# Patient Record
Sex: FEMALE | Race: BLACK OR AFRICAN AMERICAN | NOT HISPANIC OR LATINO | Employment: UNEMPLOYED | URBAN - METROPOLITAN AREA
[De-identification: names, ages, dates, MRNs, and addresses within clinical notes are randomized per-mention and may not be internally consistent; named-entity substitution may affect disease eponyms.]

---

## 2019-09-28 ENCOUNTER — HOSPITAL ENCOUNTER (EMERGENCY)
Facility: HOSPITAL | Age: 24
Discharge: LEFT BEFORE DISCHARGE | End: 2019-09-28
Attending: EMERGENCY MEDICINE

## 2019-09-28 VITALS
RESPIRATION RATE: 16 BRPM | DIASTOLIC BLOOD PRESSURE: 77 MMHG | OXYGEN SATURATION: 99 % | WEIGHT: 156.94 LBS | TEMPERATURE: 99 F | SYSTOLIC BLOOD PRESSURE: 116 MMHG | HEIGHT: 68 IN | HEART RATE: 73 BPM | BODY MASS INDEX: 23.79 KG/M2

## 2019-09-28 DIAGNOSIS — N89.8 VAGINAL ITCHING: Primary | ICD-10-CM

## 2019-09-28 LAB
B-HCG UR QL: NEGATIVE
BACTERIA #/AREA URNS HPF: ABNORMAL /HPF
BACTERIA GENITAL QL WET PREP: ABNORMAL
BILIRUB UR QL STRIP: NEGATIVE
CLARITY UR: ABNORMAL
CLUE CELLS VAG QL WET PREP: ABNORMAL
COLOR UR: YELLOW
FILAMENT FUNGI VAG WET PREP-#/AREA: ABNORMAL
GLUCOSE UR QL STRIP: NEGATIVE
HGB UR QL STRIP: NEGATIVE
KETONES UR QL STRIP: ABNORMAL
LEUKOCYTE ESTERASE UR QL STRIP: ABNORMAL
MICROSCOPIC COMMENT: ABNORMAL
NITRITE UR QL STRIP: NEGATIVE
PH UR STRIP: 6 [PH] (ref 5–8)
PROT UR QL STRIP: NEGATIVE
SP GR UR STRIP: >=1.03 (ref 1–1.03)
SPECIMEN SOURCE: ABNORMAL
SQUAMOUS #/AREA URNS HPF: 30 /HPF
T VAGINALIS GENITAL QL WET PREP: ABNORMAL
URN SPEC COLLECT METH UR: ABNORMAL
UROBILINOGEN UR STRIP-ACNC: NEGATIVE EU/DL
WBC #/AREA URNS HPF: 15 /HPF (ref 0–5)
WBC #/AREA VAG WET PREP: ABNORMAL
YEAST GENITAL QL WET PREP: ABNORMAL

## 2019-09-28 PROCEDURE — 81000 URINALYSIS NONAUTO W/SCOPE: CPT

## 2019-09-28 PROCEDURE — 87086 URINE CULTURE/COLONY COUNT: CPT

## 2019-09-28 PROCEDURE — 81025 URINE PREGNANCY TEST: CPT

## 2019-09-28 PROCEDURE — 99284 EMERGENCY DEPT VISIT MOD MDM: CPT

## 2019-09-28 PROCEDURE — 87210 SMEAR WET MOUNT SALINE/INK: CPT

## 2019-09-28 NOTE — ED PROVIDER NOTES
SCRIBE #1 NOTE: ICarla, am scribing for, and in the presence of, Mike Juares MD. I have scribed the HPI, ROS, and PEx.     SCRIBE #2 NOTE: I, Solange Rose, am scribing for, and in the presence of,  Miriam Hughes MD. I have scribed the remaining portions of the note not scribed by Scribe #1.      History     Chief Complaint   Patient presents with    Vaginal Itching     dx with bacterial vaginosis 3 week ago. yeast infection following completion of treatment. labia is currently swollen, itching and milky discharges x 3 weeks       Review of patient's allergies indicates:  No Known Allergies      History of Present Illness     HPI    9/28/2019, 5:16 AM  History obtained from the patient      History of Present Illness: Moose Fong is a 24 y.o. female patient who presents to the Emergency Department for evaluation of vaginal itching which onset gradually 3 weeks ago. Pt states she was dx with bacterial vaginosis 3 weeks ago and completed 7 days of Clindamycin. Following, pt began noticing a thick white discharge and took 3 days of Monistat. Pt is sexually active with 1 partner. She reports continued discharge after douching and took another 3 days of Monistat with no relief. Symptoms are constant and moderate in severity. No mitigating or exacerbating factors reported. Associated sxs include labia swelling. Patient denies any fever, chills, n/v, dysuria, urinary frequency/urgency, genital sore, hematuria, pelvic pain, vaginal bleeding, vaginal pain, dizziness, and all other sxs at this time. No further complaints or concerns at this time.       Arrival mode: Personal vehicle    PCP: Primary Doctor No        Past Medical History:  History reviewed. No pertinent history.     Past Surgical History:  History reviewed. No pertinent history.       Family History:  History reviewed. No pertinent history.     Social History:  Social History     Tobacco Use    Smoking status: Unknown    Substance and Sexual Activity    Alcohol use: Unknown    Drug use: Unknown    Sexual activity: yes        Review of Systems     Review of Systems   Constitutional: Negative for chills and fever.   HENT: Negative for sore throat.    Respiratory: Negative for cough and shortness of breath.    Cardiovascular: Negative for chest pain.   Gastrointestinal: Negative for abdominal pain, nausea and vomiting.   Genitourinary: Positive for vaginal discharge (white). Negative for dysuria, frequency, genital sores, hematuria, pelvic pain, urgency, vaginal bleeding and vaginal pain.        (+) vaginal itching  (+) labia swelling   Musculoskeletal: Negative for back pain.   Skin: Negative for pallor.   Neurological: Negative for dizziness, weakness and numbness.   All other systems reviewed and are negative.       Physical Exam     Initial Vitals [09/28/19 0502]   BP Pulse Resp Temp SpO2   116/77 73 16 98.7 °F (37.1 °C) 99 %      MAP       --          Physical Exam  Nursing Notes and Vital Signs Reviewed.  Constitutional: Patient is in no acute distress. Well-developed and well-nourished.  Head: Atraumatic. Normocephalic.  Eyes: PERRL. EOM intact. Conjunctivae are not pale. No scleral icterus.  ENT: Mucous membranes are moist. Oropharynx is clear and symmetric.    Neck: Supple. Full ROM. No lymphadenopathy.  Cardiovascular: Regular rate. Regular rhythm. No murmurs, rubs, or gallops. Distal pulses are 2+ and symmetric.  Pulmonary/Chest: No respiratory distress. Clear to auscultation bilaterally. No wheezing or rales.  Abdominal: Soft and non-distended.  There is no tenderness.  No rebound, guarding, or rigidity.   Pelvic: A female chaperone was present for this examination. Nl external inspection. Erythema and swelling of the labia.  Musculoskeletal: Moves all extremities. No obvious deformities. No edema. No calf tenderness.  Skin: Warm and dry.  Neurological:  Alert, awake, and appropriate.  Normal speech.  No acute focal  "neurological deficits are appreciated.  Psychiatric: Normal affect. Good eye contact. Appropriate in content.     ED Course   Procedures  ED Vital Signs:  Vitals:    09/28/19 0502   BP: 116/77   Pulse: 73   Resp: 16   Temp: 98.7 °F (37.1 °C)   TempSrc: Oral   SpO2: 99%   Weight: 71.2 kg (156 lb 15.5 oz)   Height: 5' 8" (1.727 m)       Abnormal Lab Results:  Labs Reviewed   VAGINAL SCREEN - Abnormal; Notable for the following components:       Result Value    WBC - Vaginal Screen Moderate (*)     Bacteria - Vaginal Screen Many (*)     All other components within normal limits   URINALYSIS, REFLEX TO URINE CULTURE - Abnormal; Notable for the following components:    Appearance, UA Cloudy (*)     Specific Gravity, UA >=1.030 (*)     Ketones, UA Trace (*)     Leukocytes, UA 2+ (*)     All other components within normal limits    Narrative:     Preferred Collection Type->Urine, Clean Catch   URINALYSIS MICROSCOPIC - Abnormal; Notable for the following components:    WBC, UA 15 (*)     Bacteria Moderate (*)     All other components within normal limits    Narrative:     Preferred Collection Type->Urine, Clean Catch   CULTURE, URINE   PREGNANCY TEST, URINE RAPID               The Emergency Provider reviewed the vital signs and test results, which are outlined above.     ED Discussion     6:00 AM: Dr. Juares transfers care of pt to Dr. Hughes pending lab results.    6:22 AM: Pt is A&O x3, appropriate and competent at this time. Pt voices desire to leave hospital because she has to go to work. She would like to be called with results. D/w pt in length need for further evaluation and treatment due to HPI and PEx. Pt declines any further evaluation or tx at this time. All risks, including worsening sx, permanent bodily harm and death, were discussed in length. Pt acknowledges all risk at this time and agrees to sign AMA form. Pt given RTER instructions. All questions and concerns addressed at this time. Pt leaving AMA.      "   Medical Decision Making:   Clinical Tests:   Lab Tests: Ordered and Reviewed           ED Medication(s):  Medications - No data to display    There are no discharge medications for this patient.              Scribe Attestation:   Scribe #1: I performed the above scribed service and the documentation accurately describes the services I performed. I attest to the accuracy of the note.     Attending:   Physician Attestation Statement for Scribe #1: I, Mike Juares MD, personally performed the services described in this documentation, as scribed by Carla Amaral, in my presence, and it is both accurate and complete.       Scribe Attestation:   Scribe #2: I performed the above scribed service and the documentation accurately describes the services I performed. I attest to the accuracy of the note.    Attending Attestation:           Physician Attestation for Scribe:    Physician Attestation Statement for Scribe #2: I, Miriam Hughes MD, reviewed documentation, as scribed by Solange Rose in my presence, and it is both accurate and complete. I also acknowledge and confirm the content of the note done by Scribe #1.           Clinical Impression     No diagnosis found.    Disposition:   Disposition: CONSTANCE Hughes MD  09/29/19 0639

## 2019-09-29 ENCOUNTER — NURSE TRIAGE (OUTPATIENT)
Dept: ADMINISTRATIVE | Facility: CLINIC | Age: 24
End: 2019-09-29

## 2019-09-29 NOTE — TELEPHONE ENCOUNTER
Reason for Disposition   [1] Caller requesting NON-URGENT health information AND [2] PCP's office is the best resource    Protocols used: INFORMATION ONLY CALL-A-    Pt called requesting lab results. Instructed to discuss with the Provider that ordered the labs on Monday.

## 2019-09-30 LAB
BACTERIA UR CULT: NORMAL
BACTERIA UR CULT: NORMAL

## 2019-10-01 ENCOUNTER — TELEPHONE (OUTPATIENT)
Dept: EMERGENCY MEDICINE | Facility: HOSPITAL | Age: 24
End: 2019-10-01

## 2019-10-06 ENCOUNTER — TELEPHONE (OUTPATIENT)
Dept: EMERGENCY MEDICINE | Facility: HOSPITAL | Age: 24
End: 2019-10-06

## 2019-10-06 NOTE — TELEPHONE ENCOUNTER
"Patient called and wanting antibiotic for "infection" and pain medication. Patient instructed that she was suppose to follow up with PCP. Patient verbalized that she dioesnt have PCP or insurance. Discussed patien with Abhi RAMIREZ NP and flagy 500 mg tablet po BID x 7 days called into pharmacy. Patient instruced to come to ED if symptoms worsen, fever or abd pain, verbalized understanding.   "

## 2019-12-17 PROBLEM — J32.2 CHRONIC ETHMOIDAL SINUSITIS: Status: ACTIVE | Noted: 2019-12-17

## 2019-12-17 PROBLEM — J34.89 NASAL OBSTRUCTION: Status: ACTIVE | Noted: 2019-12-17

## 2019-12-17 PROBLEM — J32.0 CHRONIC MAXILLARY SINUSITIS: Status: ACTIVE | Noted: 2019-12-17

## 2019-12-17 PROBLEM — J33.9 NASAL POLYPS: Status: ACTIVE | Noted: 2019-12-17

## 2019-12-17 PROBLEM — J32.1 CHRONIC FRONTAL SINUSITIS: Status: ACTIVE | Noted: 2019-12-17

## 2019-12-17 PROBLEM — J32.3 CHRONIC SPHENOIDAL SINUSITIS: Status: ACTIVE | Noted: 2019-12-17

## 2022-09-01 ENCOUNTER — OFFICE VISIT (OUTPATIENT)
Dept: OTOLARYNGOLOGY | Facility: CLINIC | Age: 27
End: 2022-09-01
Payer: COMMERCIAL

## 2022-09-01 VITALS
DIASTOLIC BLOOD PRESSURE: 80 MMHG | TEMPERATURE: 98 F | HEART RATE: 81 BPM | SYSTOLIC BLOOD PRESSURE: 124 MMHG | BODY MASS INDEX: 26.98 KG/M2 | WEIGHT: 177.5 LBS

## 2022-09-01 DIAGNOSIS — J34.3 NASAL TURBINATE HYPERTROPHY: ICD-10-CM

## 2022-09-01 DIAGNOSIS — J34.89 NASAL OBSTRUCTION: ICD-10-CM

## 2022-09-01 DIAGNOSIS — T48.5X5A RHINITIS MEDICAMENTOSA: ICD-10-CM

## 2022-09-01 DIAGNOSIS — J30.89 NON-SEASONAL ALLERGIC RHINITIS, UNSPECIFIED TRIGGER: Primary | ICD-10-CM

## 2022-09-01 DIAGNOSIS — J45.909 ASTHMA, UNSPECIFIED ASTHMA SEVERITY, UNSPECIFIED WHETHER COMPLICATED, UNSPECIFIED WHETHER PERSISTENT: ICD-10-CM

## 2022-09-01 DIAGNOSIS — J31.0 RHINITIS MEDICAMENTOSA: ICD-10-CM

## 2022-09-01 PROBLEM — J33.9 NASAL POLYPS: Status: RESOLVED | Noted: 2019-12-17 | Resolved: 2022-09-01

## 2022-09-01 PROCEDURE — 99204 OFFICE O/P NEW MOD 45 MIN: CPT | Mod: 25,S$GLB,, | Performed by: OTOLARYNGOLOGY

## 2022-09-01 PROCEDURE — 31231 NASAL ENDOSCOPY DX: CPT | Mod: S$GLB,,, | Performed by: OTOLARYNGOLOGY

## 2022-09-01 PROCEDURE — 99999 PR PBB SHADOW E&M-EST. PATIENT-LVL IV: ICD-10-PCS | Mod: PBBFAC,,, | Performed by: OTOLARYNGOLOGY

## 2022-09-01 PROCEDURE — 99999 PR PBB SHADOW E&M-EST. PATIENT-LVL IV: CPT | Mod: PBBFAC,,, | Performed by: OTOLARYNGOLOGY

## 2022-09-01 PROCEDURE — 99204 PR OFFICE/OUTPT VISIT, NEW, LEVL IV, 45-59 MIN: ICD-10-PCS | Mod: 25,S$GLB,, | Performed by: OTOLARYNGOLOGY

## 2022-09-01 PROCEDURE — 31231 PR NASAL ENDOSCOPY, DX: ICD-10-PCS | Mod: S$GLB,,, | Performed by: OTOLARYNGOLOGY

## 2022-09-01 RX ORDER — ALBUTEROL SULFATE 90 UG/1
AEROSOL, METERED RESPIRATORY (INHALATION)
COMMUNITY
Start: 2022-04-19

## 2022-09-01 RX ORDER — NORETHINDRONE ACETATE AND ETHINYL ESTRADIOL .02; 1 MG/1; MG/1
1 TABLET ORAL DAILY
COMMUNITY
Start: 2022-03-29

## 2022-09-01 NOTE — PATIENT INSTRUCTIONS
Keep up to date on our practice locations and hours!    Let us know how we are doing!              [email or web address]

## 2022-09-01 NOTE — PROGRESS NOTES
Referring Provider:    Krys Self  No address on file  Subjective:   Patient: Moose Fong [id number], :1995   Visit date:2022 10:20 AM    Chief Complaint:  Cough (Nasal congestions )    HPI:  Moose is a 27 y.o. female who I was asked to see in consultation for evaluation of the following issue(s):    Patient is a 24 year old female who presents to clinic complaining of chronic nasal obstruction/congestion with associated rhinorrhea(clear), PND, and itchy/watery eyes.  She underwent septoplasty/balloon/polypectomy by Dr. El in 2019; she states this surgery provided relief for approximately 1 week and then symptoms returned to baseline.    Patient has also seen Dr. Samaniego for complaint following surgery and received steroid injection to turbinates, which she states provided mild transient relief.  Her biggest complaint is nasal obstruction(L>R) and she states there is never a time where she feels like she can move adequate air via nasal passageway.  It is starting to effect quality of life and her studying.  Reports no relief of symptoms with ABX, steroid nasal sprays, and astelin.      The patient reports the following allergy/sinus symptoms (negative unless checked off):   [x] Sneezing   [x] Nasal congestion  [x] Itchy nose  [x]  Rhinorrhea  [] Watery eyes  [] Itchy eyes  [x] Cough  [x] Post-nasal drip  [] Ear pressure  [] Ear popping   [] Otorrhea  [x] Facial pressure  [x] Asthma  [] Eczema  [] Other    Medications:  [x]Anti-histamines:   [] Allegra   [] Benadryl   [] Claritin   [] Xyzal   [] Zyrtec   [] other  [] Singulair  [x] Nasal Sprays:   [] Astelin   [] Atrovent   [x] Flonase   [] Nasacort   [] Nasonex   [x] Other- oxymetazoline  [] SCIT  [] SLIT   [x] Oral steroid  [x] IM steroid  [] Antibiotics:   [] Amoxicillin   [] Augmentin   [] Azithromycin   [] Bactrim   [] Cefdinir   [] Clindamycin   [] Doxycycline   [] Levaquin   [] Other  [] Other    Severity: severe    She  has had 4 or 5 sinus infections in the past 12 months.     Allergy testing for this patient was not done.    Current smoker:  No    There have been no recent imaging study of the sinuses (none).  No results found for this or any previous visit.        Objective:     Physical Exam:  Vitals:  /80   Pulse 81   Temp 97.5 °F (36.4 °C) (Temporal)   Wt 80.5 kg (177 lb 7.5 oz)   LMP  (LMP Unknown) Comment: birth control  BMI 26.98 kg/m²   General appearance:  Well developed, well nourished    Communication:  no hoarseness, no dysphonia    Ears:  Otoscopy of external auditory canals and tympanic membranes was normal, clinical speech reception thresholds grossly intact, no mass/lesion of auricle.    Nose:  No masses/lesions of external nose, nasal mucosa, septum, and turbinates were within normal limits.    Mouth:  No mass/lesion of lips, teeth, gums, hard/soft palate, tongue, tonsils, or oropharynx.    Cardiovascular:  No pedal edema; Radial Pulses +2     Neck & Lymphatics:  No cervical lymphadenopathy, no neck mass/crepitus/ asymmetry, trachea is midline, no thyroid enlargement/tenderness/mass.    NASAL ENDOSCOPY     Procedure: Risks, benefits, and alternatives of the procedure were discussed with the patient, and the patient consented to the nasal endoscopy.  The nasal cavity was sprayed with a topical decongestant and anesthetic (if needed). The endoscope was passed into each nostril and each nasal cavity was visualized.  On each side the nasal cavity, sinuses (if open), turbinates, middle and superior meatus, sphenoethmoidal recess and septum were examined with the findings described below. At the end of the examination, the scope was removed. The patient tolerated the procedure well with no complications.       Endoscopic Sinonasal Exam Findings:  -     The right side has moderate edema  -     The left side has moderate edema  -     Nasal secretions: thin mucous bilaterally  -     Nasal septum: no significant  deviation or perforation appreciated   -     Inferior turbinate: hypertrophy or edema (Severe) bilaterally  -     Middle turbinate: hypertrophy or edema (Mild) bilaterally  -     Other findings: none    Total Nasal Endoscopic Polyp Score Reference Guide        Assessment & Plan:   Moose was seen today for cough.    Diagnoses and all orders for this visit:    Non-seasonal allergic rhinitis, unspecified trigger  -     Ambulatory referral/consult to Allergy; Future    Nasal turbinate hypertrophy    Rhinitis medicamentosa    Asthma, unspecified asthma severity, unspecified whether complicated, unspecified whether persistent  -     Ambulatory referral/consult to Allergy; Future    Nasal obstruction        -SINUSITIS/RHINITIS  Moose presents today for initial evaluation.  They have multiple sinonasal complaints and determination of the underlying etiology is a problem of moderate to high complexity.  Patients may present with sinonasal symptoms such as nasal obstruction as a primary anatomic disorder.  Patients may also present with recurrent or chronic inflammatory sinonasal symptoms.  Generally, patients can be stratified into one of several groups.      The first group represents patients who have frequent or recurrent upper respiratory infections, most frequently viral.  These patients most frequently have normally functioning immune system's but have high levels of exposure.  This is commonly seen in nurses and schoolteachers as well as other groups who spend large amounts of time around sick patients and children.      Other patients may have isolated rhinitis without evidence of sinusitis.  The majority of these patients have ALLERGIC rhinitis however other groups may have more rare forms of rhinitis such as nonallergic rhinitis with eosinophilia syndrome.  As a screening evaluation, if the patient has had a normal endoscopy, from time to time a simple x-ray of the sinuses may be performed in combination with  antigen specific ALLERGY testing.    The third group of patients present with significant evidence of chronic sinusitis.  Nasal endoscopy may reveal polyps or purulent drainage.  CT scan is an important aspect to determining the extent of disease.  Some patients with chronic sinusitis have an underlying etiology of ALLERGY leading to obstruction of the ostiomeatal units with furthering of the infection.  Others may have an acute infection that leads to stenosis of the sinonasal openings followed by a long, progressive course of infection.  Patients with unilateral disease who do not have inverting papilloma typically fall into this latter group.    CT scan of the sinuses has not been performed.      Antigen specific allergy testing  has not been performed.    Allergy treatment with topical steroids and/or antihistamines has not been used with good compliance with symptoms worsening.         My impression is that Moose presents with a combination of CRS and allergic rhinitis.  Exacerbated by oxymetazoline use.     My recommendation is oxymetazoline taper.  Allergy evaluation and treatment.  Return to clinic in 1 month.  Consider imaging/revision turbinate reduction if symptoms are not improving.         Thank you for allowing me to participate in the care of Moose.    León Jones MD      Answers submitted by the patient for this visit:  Review of Symptoms Questionnaire  (Submitted on 9/1/2022)  Fatigue (Tiredness)?: Yes  sinus pressure : Yes  Sinus infection(s)?: Yes  postnasal drip: Yes  None of these : Yes  Snoring?: Yes  wheezing: Yes  cough: Yes  None of these : Yes  None of these: Yes  None of these: Yes  None of these: Yes  None of these : Yes  Seasonal Allergies?: Yes  None of these : Yes  None of these: Yes  None of these: Yes  None of these: Yes

## 2022-09-06 ENCOUNTER — TELEPHONE (OUTPATIENT)
Dept: ALLERGY | Facility: CLINIC | Age: 27
End: 2022-09-06
Payer: COMMERCIAL

## 2022-09-06 ENCOUNTER — TELEPHONE (OUTPATIENT)
Dept: OTOLARYNGOLOGY | Facility: CLINIC | Age: 27
End: 2022-09-06
Payer: COMMERCIAL

## 2022-09-06 NOTE — TELEPHONE ENCOUNTER
Called pt to get her scheduled for allergy testing, no answer, left vm for pt to return call to get her scheduled

## 2022-09-06 NOTE — TELEPHONE ENCOUNTER
----- Message from Yarely Hernandez sent at 9/6/2022  1:33 PM CDT -----  Contact: Moose Carvajal is requesting a call to discuss referral to see allergist. She stated that she has been calling around but cant get an appt. Please call her back at [phone]      Thanks   JW

## 2022-09-06 NOTE — TELEPHONE ENCOUNTER
----- Message from Autumn oBo sent at 9/6/2022  2:11 PM CDT -----  Regarding: appt request/referral  Contact: pt  Dr Jones has placed a referral in the system and wants the pt to be seen before Oct 1st.  Pt can be seen at [phone].

## 2022-09-13 ENCOUNTER — TELEPHONE (OUTPATIENT)
Dept: ALLERGY | Facility: CLINIC | Age: 27
End: 2022-09-13
Payer: COMMERCIAL

## 2022-09-13 ENCOUNTER — OFFICE VISIT (OUTPATIENT)
Dept: ALLERGY | Facility: CLINIC | Age: 27
End: 2022-09-13
Payer: COMMERCIAL

## 2022-09-13 VITALS
HEART RATE: 87 BPM | WEIGHT: 175.94 LBS | HEIGHT: 69 IN | SYSTOLIC BLOOD PRESSURE: 117 MMHG | TEMPERATURE: 98 F | DIASTOLIC BLOOD PRESSURE: 83 MMHG | BODY MASS INDEX: 26.06 KG/M2

## 2022-09-13 DIAGNOSIS — J30.89 ALLERGIC RHINITIS DUE TO DUST MITE: ICD-10-CM

## 2022-09-13 DIAGNOSIS — J45.20 MILD INTERMITTENT ASTHMA WITHOUT COMPLICATION: ICD-10-CM

## 2022-09-13 PROCEDURE — 99999 PR PBB SHADOW E&M-EST. PATIENT-LVL IV: ICD-10-PCS | Mod: PBBFAC,,, | Performed by: PHYSICIAN ASSISTANT

## 2022-09-13 PROCEDURE — 99204 OFFICE O/P NEW MOD 45 MIN: CPT | Mod: 25,S$GLB,, | Performed by: PHYSICIAN ASSISTANT

## 2022-09-13 PROCEDURE — 95004 PERQ TESTS W/ALRGNC XTRCS: CPT | Mod: S$GLB,,, | Performed by: PHYSICIAN ASSISTANT

## 2022-09-13 PROCEDURE — 99999 PR PBB SHADOW E&M-EST. PATIENT-LVL IV: CPT | Mod: PBBFAC,,, | Performed by: PHYSICIAN ASSISTANT

## 2022-09-13 PROCEDURE — 95004 PR ALLERGY SKIN TESTS,ALLERGENS: ICD-10-PCS | Mod: S$GLB,,, | Performed by: PHYSICIAN ASSISTANT

## 2022-09-13 PROCEDURE — 99204 PR OFFICE/OUTPT VISIT, NEW, LEVL IV, 45-59 MIN: ICD-10-PCS | Mod: 25,S$GLB,, | Performed by: PHYSICIAN ASSISTANT

## 2022-09-13 RX ORDER — LEVOCETIRIZINE DIHYDROCHLORIDE 5 MG/1
5 TABLET, FILM COATED ORAL NIGHTLY
Qty: 30 TABLET | Refills: 11 | Status: SHIPPED | OUTPATIENT
Start: 2022-09-13 | End: 2023-09-13

## 2022-09-13 RX ORDER — IPRATROPIUM BROMIDE 21 UG/1
2 SPRAY, METERED NASAL 2 TIMES DAILY
Qty: 30 ML | Refills: 2 | Status: SHIPPED | OUTPATIENT
Start: 2022-09-13 | End: 2022-10-13

## 2022-09-13 NOTE — PATIENT INSTRUCTIONS
Dust mites are microscopic insect-like pests that live in house dust. They feed on dead skin or dander that are shed by people and pets.They can worsen asthma and allergies. Dust mites live in mattresses, bedding, upholstered furniture, carpets, and curtains in your home. No matter how clean a home is, dust mites cannot be completely eliminated. A number of things can be done to reduce the number of dust mites:  -Use a dehumidifier or air conditioner to maintain humidity levels at, or below, 50 percent.  -Encase mattress and pillows in dust mite- proof or allergen impermeable covers.  -Wash all bedding and blankets once a week in hot water, 130 to 140 degrees Fahrenheit, to kill dust mites. Non- washable bedding can be frozen overnight.  -Replace wool or feathered bedding products with synthetic materials, and traditional stuffed animals with washable ones.  -In bedrooms, replace wall to wall carpeting with bare floors, and remove fabric curtains and upholstered furniture, whenever possible.  -Use a damp mop or rag to remove dust. Never use a dry cloth, as it stirs up allergens.  -Use a double-layered microfilter bag or a HEPA filter in your vacuum .  -Wear a mask while vacuuming, and stay out of the vacuuming area for 20 minutes after vacuuming, to allow dust and allergens to settle.      Cockroach can be an allergen that worsens asthma and and nasal symptoms. If it is, the following are recommendations to decrease exposure:  - Keep food and garbage in closed, tight-lidded containers.  -Do not leave food, dirty dishes, or pet food and bowls out on countertops or floors.  -Mop the kitchen floor and wash countertops at least once a week.  -Fix leaky faucets and drain pipes.  -Fill crevices around the house through which cockroaches may enter.  -Limit the spread of food around the house, especially in the bedrooms.  -Use bait stations and other environmentally safe pesticides to prevent or reduce cockroach  infestation.

## 2022-09-13 NOTE — PROGRESS NOTES
Subjective:   Patient: Moose Fong [id number], :1995   Visit date:2022 1:18 PM    Chief Complaint:  Allergies    HPI:    Prior notes reviewed by myself.  Clinical documentation obtained by nursing staff reviewed.       HPI:     24 - 26 yo F referred by ENT, Dr. Jones, for an evaluation of environmental allergies:    Reported hx of allergy ssx since 2018 - pt was in college in [place], [place] c/o princess environment around a lot of trees and pollens  Ssx - difficulty breathing from L side of nose - breathes through mouth, dries her at night, exacerbates coughing/sore throat, PND (clear), nasal congestion, rhinorrhea, sneezing fits; year round  Meds - oxymetazoline (recently tapered off); has tried Zyrtec and allegra in the past  Never had allergy testing  Following with ENT for possible SMRIT     Recent dx of asthma - started earlier this year; albuterol prn 3-4x nights per week for the past month   No UC/ED visits, no tx with po steroid for asthma  Sleeps with ceiling fan on and floor fan    Hx of septoplasty/balloon/polypectomy by Dr. El in 2019    No food allergies    Environmental History:  Environmental Hx: Pets in the home: none. Does not smoke.       History reviewed. No pertinent past medical history.     History reviewed. No pertinent family history.    Social History     Socioeconomic History    Marital status: Single   Tobacco Use    Smoking status: Never    Smokeless tobacco: Never       Review of patient's allergies indicates:  No Known Allergies        Medication List with Changes/Refills   New Medications    IPRATROPIUM (ATROVENT) 21 MCG (0.03 %) NASAL SPRAY    2 sprays by Nasal route 2 (two) times daily.    LEVOCETIRIZINE (XYZAL) 5 MG TABLET    Take 1 tablet (5 mg total) by mouth every evening.   Current Medications    ALBUTEROL (PROVENTIL/VENTOLIN HFA) 90 MCG/ACTUATION INHALER    SMARTSI-2 Puff(s) By Mouth 4 Times Daily PRN    BUDESONIDE (PULMICORT) 0.5 MG/2 ML  "NEBULIZER SOLUTION    Combined one dose into [employer] irrigation system. Repeat the nasal irrigations twice a day.    NORETHINDRONE-ETHINYL ESTRADIOL (MICROGESTIN 1/20) 1-20 MG-MCG PER TABLET    Take 1 tablet by mouth once daily.    PREDNISONE (DELTASONE) 10 MG TABLET    30 mg x 3 days, 20 mg x 3 days, 10 mg x 3 days, then 5 mg x 3 days.         Objective:     Physical Exam:  Vitals:  /83   Pulse 87   Temp 98.4 °F (36.9 °C) (Temporal)   Ht 5' 9" (1.753 m)   Wt 79.8 kg (175 lb 14.8 oz)   LMP  (LMP Unknown) Comment: birth control  BMI 25.98 kg/m²   Constitutional:       General: No acute distress. Alert, well developed.   HENT:      Head: Normocephalic, no lesions.      Right Ear: Pinna and external ear appears normal. There is no impacted cerumen. TM clear, intact, wnl. EAC WNL.      Left Ear: Pinna and external ear appears normal. There is no impacted cerumen. TM clear, intact, wnl. EAC WNL.     Nose: No mass or lesion. Clear mucus. L IT with severe hypertrophy (against septum), R IT with mild hypertrophy.      Mouth/Throat: No oropharyngeal exudate or posterior oropharyngeal erythema.   Eyes:      General: No scleral icterus.Sclera white, extraocular movements intact.        Right eye: No discharge.         Left eye: No discharge.   Neck: Supple, no palpable nodes, no masses, trachea midline, no thyromegaly.   Cardiovascular:      Rate and Rhythm: Normal rate and regular rhythm.      Heart sounds: Normal heart sounds. No murmur heard.   Pulmonary:      Effort: Pulmonary effort is normal. No respiratory distress.      Breath sounds: Normal breath sounds. No stridor. No rhonchi, or rales. No crackles or wheezing.   Musculoskeletal:         General: No swelling, tenderness, deformity or signs of injury.    Skin:     General: Skin is warm.      Coloration: Skin is not jaundiced or pale.      Findings: No bruising, erythema, or rash.   Neurological:      Alert. Moves all extremities " spontaneously  Psychiatric:         Mood is normal. Behavior is normal.            Allergy Skin Prick testing:  Histamine: 9x9  Saline: 5x5  The patient had an appropriate response to the histamine and saline.  The following were the size of the saline and negative except for those items listed in bold:  Cat, Dog, Dust mite(F 10x11 and P 11x12), Cockroach 7x7, Alternaria, Aspergillus, Helminthosporium, Bald Pesotum  [place], Elm, Mountain, Ligustrum, Oak, Pecan, Red Cedar, Orchard, [place], B[place], Daniel, Red Top/Agrostis Alba, Rye/Lolium, Berto, English Plantain, Marsh Elder, Pigweed, Ragweed, Russian Thistle, Curvularia/Drechsiera Spicifera, Epicoccum, Fusarium, Hormodendrum/Cladosporium, Penicillium, Monilia/candida, Phoma, Stemphylium  Total: 38      Assessment & Plan:   Allergic rhinitis due to dust mite  -     Ambulatory referral/consult to Allergy    Mild intermittent asthma without complication  -     Ambulatory referral/consult to Allergy    Other orders  -     levocetirizine (XYZAL) 5 MG tablet; Take 1 tablet (5 mg total) by mouth every evening.  Dispense: 30 tablet; Refill: 11  -     ipratropium (ATROVENT) 21 mcg (0.03 %) nasal spray; 2 sprays by Nasal route 2 (two) times daily.  Dispense: 30 mL; Refill: 2    Skin prick allergy testing + Dust mites (Pt and F), cockroach  Home measures reviewed in detail and handout given today  Trial with Xyzal and Atrovent, taken consistently.  Plan for f/u in 6-8 wks   Consider AIT in future   Pt has f/u with ENT on 09/29 to discuss SMRIT further     Thank you for allowing me to participate in the care of Moose.        Malena Sharpe PA-C  [facility]  [facility]  [address]  [place] [address]  P: [phone]  F: [phone]      Total time spent in the care of this patient    New  3  [] 30-44 min  4  [x] 45-59 min  5  [] 60-74 min    Established  3  [] 20-29 min  4  [] 30-39 min  5  [] 40-54 min      [x]  preparing to see the patient (eg, review of tests)  [x] obtaining and/or reviewing separately obtained history  [x] performing a medically appropriate examination and/or evaluation  [x] counseling and educating the patient/family/caregiver  [x] ordering medications, tests, or procedures  [] referring and communicating with other health care professionals (when not separately reported)  [x] documenting clinical information in the electronic or other health record  [x] independently interpreting results (not separately reported) and communicating results to the  patient/ family/caregiver  [] care coordination (not separately reported)

## 2022-09-13 NOTE — TELEPHONE ENCOUNTER
----- Message from Kendra Joseph sent at 9/13/2022  7:46 AM CDT -----  Pt have an appt today at 8:30 but stated her GPS have her a hour drive. She would like to know if there is a later appt today or can she still come to that appt. Call back number is [phone]. Thx. EL

## 2022-09-29 ENCOUNTER — OFFICE VISIT (OUTPATIENT)
Dept: OTOLARYNGOLOGY | Facility: CLINIC | Age: 27
End: 2022-09-29
Payer: COMMERCIAL

## 2022-09-29 ENCOUNTER — TELEPHONE (OUTPATIENT)
Dept: PREADMISSION TESTING | Facility: HOSPITAL | Age: 27
End: 2022-09-29
Payer: COMMERCIAL

## 2022-09-29 ENCOUNTER — ANESTHESIA EVENT (OUTPATIENT)
Dept: SURGERY | Facility: HOSPITAL | Age: 27
End: 2022-09-29
Payer: COMMERCIAL

## 2022-09-29 VITALS — HEIGHT: 69 IN | TEMPERATURE: 98 F | BODY MASS INDEX: 26.32 KG/M2 | WEIGHT: 177.69 LBS

## 2022-09-29 DIAGNOSIS — J34.89 NASAL OBSTRUCTION: Primary | ICD-10-CM

## 2022-09-29 DIAGNOSIS — J30.89 NON-SEASONAL ALLERGIC RHINITIS, UNSPECIFIED TRIGGER: ICD-10-CM

## 2022-09-29 DIAGNOSIS — J34.3 NASAL TURBINATE HYPERTROPHY: ICD-10-CM

## 2022-09-29 PROBLEM — J32.0 CHRONIC MAXILLARY SINUSITIS: Status: RESOLVED | Noted: 2019-12-17 | Resolved: 2022-09-29

## 2022-09-29 PROBLEM — J32.1 CHRONIC FRONTAL SINUSITIS: Status: RESOLVED | Noted: 2019-12-17 | Resolved: 2022-09-29

## 2022-09-29 PROBLEM — J32.2 CHRONIC ETHMOIDAL SINUSITIS: Status: RESOLVED | Noted: 2019-12-17 | Resolved: 2022-09-29

## 2022-09-29 PROBLEM — J32.3 CHRONIC SPHENOIDAL SINUSITIS: Status: RESOLVED | Noted: 2019-12-17 | Resolved: 2022-09-29

## 2022-09-29 PROCEDURE — 99999 PR PBB SHADOW E&M-EST. PATIENT-LVL IV: ICD-10-PCS | Mod: PBBFAC,,, | Performed by: OTOLARYNGOLOGY

## 2022-09-29 PROCEDURE — 31231 PR NASAL ENDOSCOPY, DX: ICD-10-PCS | Mod: S$GLB,,, | Performed by: OTOLARYNGOLOGY

## 2022-09-29 PROCEDURE — 99214 PR OFFICE/OUTPT VISIT, EST, LEVL IV, 30-39 MIN: ICD-10-PCS | Mod: 25,S$GLB,, | Performed by: OTOLARYNGOLOGY

## 2022-09-29 PROCEDURE — 31231 NASAL ENDOSCOPY DX: CPT | Mod: S$GLB,,, | Performed by: OTOLARYNGOLOGY

## 2022-09-29 PROCEDURE — 99999 PR PBB SHADOW E&M-EST. PATIENT-LVL IV: CPT | Mod: PBBFAC,,, | Performed by: OTOLARYNGOLOGY

## 2022-09-29 PROCEDURE — 99214 OFFICE O/P EST MOD 30 MIN: CPT | Mod: 25,S$GLB,, | Performed by: OTOLARYNGOLOGY

## 2022-09-29 NOTE — ANESTHESIA PREPROCEDURE EVALUATION
09/29/2022  Moose Fong is a 27 y.o., female.      Pre-op Assessment    I have reviewed the Patient Summary Reports.     I have reviewed the Nursing Notes. I have reviewed the NPO Status.   I have reviewed the Medications.     Review of Systems  Anesthesia Hx:  No problems with previous Anesthesia  Denies Family Hx of Anesthesia complications.   Denies Personal Hx of Anesthesia complications.   Social:  Non-Smoker    Hematology/Oncology:  Hematology Normal        EENT/Dental:   chronic allergic rhinitis   Cardiovascular:  Cardiovascular Normal     Pulmonary:   Asthma mild    Renal/:  Renal/ Normal     Hepatic/GI:  Hepatic/GI Normal    Neurological:  Neurology Normal    Psych:  Psychiatric Normal           Physical Exam  General: Alert and Oriented    Airway:  Mallampati: I   Mouth Opening: Normal  TM Distance: Normal  Tongue: Normal  Neck ROM: Normal ROM    Dental:  Intact    Chest/Lungs:  Clear to auscultation, Normal Respiratory Rate    Heart:  Rate: Normal  Rhythm: Regular Rhythm        Anesthesia Plan  Type of Anesthesia, risks & benefits discussed:    Anesthesia Type: Gen ETT  Intra-op Monitoring Plan: Standard ASA Monitors  Post Op Pain Control Plan: multimodal analgesia and IV/PO Opioids PRN  Induction:  IV  Informed Consent: Informed consent signed with the Patient and all parties understand the risks and agree with anesthesia plan.  All questions answered.   ASA Score: 2  Day of Surgery Review of History & Physical: H&P Update referred to the surgeon/provider.    Ready For Surgery From Anesthesia Perspective.     .

## 2022-09-29 NOTE — PROGRESS NOTES
Referring Provider:    No referring provider defined for this encounter.  Subjective:   Patient: Moose Fong [id number], :1995   Visit date:2022 10:20 AM    Chief Complaint:  Follow-up    HPI:  Moose is a 27 y.o. female who I was asked to see in consultation for evaluation of the following issue(s):    Patient is a 24 year old female who presents to clinic complaining of chronic nasal obstruction/congestion with associated rhinorrhea(clear), PND, and itchy/watery eyes.  She underwent septoplasty/balloon/polypectomy by Dr. El in 2019; she states this surgery provided relief for approximately 1 week and then symptoms returned to baseline.    Patient has also seen Dr. Samaniego for complaint following surgery and received steroid injection to turbinates, which she states provided mild transient relief.  Her biggest complaint is nasal obstruction(L>R) and she states there is never a time where she feels like she can move adequate air via nasal passageway.  It is starting to effect quality of life and her studying.  Reports no relief of symptoms with ABX, steroid nasal sprays, and astelin.      The patient reports the following allergy/sinus symptoms (negative unless checked off):   [x] Sneezing   [x] Nasal congestion  [x] Itchy nose  [x]  Rhinorrhea  [] Watery eyes  [] Itchy eyes  [x] Cough  [x] Post-nasal drip  [] Ear pressure  [] Ear popping   [] Otorrhea  [x] Facial pressure  [x] Asthma  [] Eczema  [] Other    Medications:  [x]Anti-histamines:   [] Allegra   [] Benadryl   [] Claritin   [] Xyzal   [] Zyrtec   [] other  [] Singulair  [x] Nasal Sprays:   [] Astelin   [] Atrovent   [x] Flonase   [] Nasacort   [] Nasonex   [x] Other- oxymetazoline  [] SCIT  [] SLIT   [x] Oral steroid  [x] IM steroid  [] Antibiotics:   [] Amoxicillin   [] Augmentin   [] Azithromycin   [] Bactrim   [] Cefdinir   [] Clindamycin   [] Doxycycline   [] Levaquin   [] Other  [] Other    Severity: severe    She has  "had 4 or 5 sinus infections in the past 12 months.     Allergy testing for this patient was not done.    Current smoker:  No    There have been no recent imaging study of the sinuses (none).  No results found for this or any previous visit.        Objective:     Physical Exam:  Vitals:  Temp 97.9 °F (36.6 °C) (Temporal)   Ht 5' 9" (1.753 m)   Wt 80.6 kg (177 lb 11.1 oz)   LMP  (LMP Unknown) Comment: birth control  BMI 26.24 kg/m²   General appearance:  Well developed, well nourished    Communication:  no hoarseness, no dysphonia    Ears:  Otoscopy of external auditory canals and tympanic membranes was normal, clinical speech reception thresholds grossly intact, no mass/lesion of auricle.    Nose:  No masses/lesions of external nose, nasal mucosa, septum, and turbinates were within normal limits.    Mouth:  No mass/lesion of lips, teeth, gums, hard/soft palate, tongue, tonsils, or oropharynx.    Cardiovascular:  No pedal edema; Radial Pulses +2     Neck & Lymphatics:  No cervical lymphadenopathy, no neck mass/crepitus/ asymmetry, trachea is midline, no thyroid enlargement/tenderness/mass.    NASAL ENDOSCOPY     Procedure: Risks, benefits, and alternatives of the procedure were discussed with the patient, and the patient consented to the nasal endoscopy.  The nasal cavity was sprayed with a topical decongestant and anesthetic (if needed). The endoscope was passed into each nostril and each nasal cavity was visualized.  On each side the nasal cavity, sinuses (if open), turbinates, middle and superior meatus, sphenoethmoidal recess and septum were examined with the findings described below. At the end of the examination, the scope was removed. The patient tolerated the procedure well with no complications.       Endoscopic Sinonasal Exam Findings:  -     The right side has moderate edema  -     The left side has moderate edema  -     Nasal secretions: thin mucous bilaterally  -     Nasal septum: no significant " deviation or perforation appreciated   -     Inferior turbinate: hypertrophy or edema (Severe) bilaterally  -     Middle turbinate: hypertrophy or edema (Mild) bilaterally  -     Other findings: none    Total Nasal Endoscopic Polyp Score Reference Guide        Assessment & Plan:   Moose was seen today for follow-up.    Diagnoses and all orders for this visit:    Nasal obstruction  -     Case Request Operating Room: CAUTERIZATION, MUCOSA, NASAL TURBINATE    Nasal turbinate hypertrophy  Comments:  Severe bilateral  No improvement despite prolonged nasal steroids  Recommend SMRIT  Orders:  -     Case Request Operating Room: CAUTERIZATION, MUCOSA, NASAL TURBINATE    Non-seasonal allergic rhinitis, unspecified trigger  Comments:  Dust mite(F 10x11 and P 11x12), Cockroach 7x7  Xyzal, Atrovent, Fluticasone  Follows in Allergy Clinic    We discussed at length the risk of surgery including, but not limited to, recurrence of disease, bleeding, infection, septal perforation, tooth or cheek numbness, vision changes, orbital injury, CSF leak and changes in smell.  The patient had opportunity to ask questions and I answered all of them to their satisfaction.  We will proceed as planned.          Thank you for allowing me to participate in the care of Moose.    León Jones MD      Answers submitted by the patient for this visit:  Review of Symptoms Questionnaire  (Submitted on 9/1/2022)  Fatigue (Tiredness)?: Yes  sinus pressure : Yes  Sinus infection(s)?: Yes  postnasal drip: Yes  None of these : Yes  Snoring?: Yes  wheezing: Yes  cough: Yes  None of these : Yes  None of these: Yes  None of these: Yes  None of these: Yes  None of these : Yes  Seasonal Allergies?: Yes  None of these : Yes  None of these: Yes  None of these: Yes  None of these: Yes

## 2022-09-29 NOTE — H&P (VIEW-ONLY)
Referring Provider:    No referring provider defined for this encounter.  Subjective:   Patient: Moose Fong [id number], :1995   Visit date:2022 10:20 AM    Chief Complaint:  Follow-up    HPI:  Moose is a 27 y.o. female who I was asked to see in consultation for evaluation of the following issue(s):    Patient is a 24 year old female who presents to clinic complaining of chronic nasal obstruction/congestion with associated rhinorrhea(clear), PND, and itchy/watery eyes.  She underwent septoplasty/balloon/polypectomy by Dr. El in 2019; she states this surgery provided relief for approximately 1 week and then symptoms returned to baseline.    Patient has also seen Dr. Samaniego for complaint following surgery and received steroid injection to turbinates, which she states provided mild transient relief.  Her biggest complaint is nasal obstruction(L>R) and she states there is never a time where she feels like she can move adequate air via nasal passageway.  It is starting to effect quality of life and her studying.  Reports no relief of symptoms with ABX, steroid nasal sprays, and astelin.      The patient reports the following allergy/sinus symptoms (negative unless checked off):   [x] Sneezing   [x] Nasal congestion  [x] Itchy nose  [x]  Rhinorrhea  [] Watery eyes  [] Itchy eyes  [x] Cough  [x] Post-nasal drip  [] Ear pressure  [] Ear popping   [] Otorrhea  [x] Facial pressure  [x] Asthma  [] Eczema  [] Other    Medications:  [x]Anti-histamines:   [] Allegra   [] Benadryl   [] Claritin   [] Xyzal   [] Zyrtec   [] other  [] Singulair  [x] Nasal Sprays:   [] Astelin   [] Atrovent   [x] Flonase   [] Nasacort   [] Nasonex   [x] Other- oxymetazoline  [] SCIT  [] SLIT   [x] Oral steroid  [x] IM steroid  [] Antibiotics:   [] Amoxicillin   [] Augmentin   [] Azithromycin   [] Bactrim   [] Cefdinir   [] Clindamycin   [] Doxycycline   [] Levaquin   [] Other  [] Other    Severity: severe    She has  "had 4 or 5 sinus infections in the past 12 months.     Allergy testing for this patient was not done.    Current smoker:  No    There have been no recent imaging study of the sinuses (none).  No results found for this or any previous visit.        Objective:     Physical Exam:  Vitals:  Temp 97.9 °F (36.6 °C) (Temporal)   Ht 5' 9" (1.753 m)   Wt 80.6 kg (177 lb 11.1 oz)   LMP  (LMP Unknown) Comment: birth control  BMI 26.24 kg/m²   General appearance:  Well developed, well nourished    Communication:  no hoarseness, no dysphonia    Ears:  Otoscopy of external auditory canals and tympanic membranes was normal, clinical speech reception thresholds grossly intact, no mass/lesion of auricle.    Nose:  No masses/lesions of external nose, nasal mucosa, septum, and turbinates were within normal limits.    Mouth:  No mass/lesion of lips, teeth, gums, hard/soft palate, tongue, tonsils, or oropharynx.    Cardiovascular:  No pedal edema; Radial Pulses +2     Neck & Lymphatics:  No cervical lymphadenopathy, no neck mass/crepitus/ asymmetry, trachea is midline, no thyroid enlargement/tenderness/mass.    NASAL ENDOSCOPY     Procedure: Risks, benefits, and alternatives of the procedure were discussed with the patient, and the patient consented to the nasal endoscopy.  The nasal cavity was sprayed with a topical decongestant and anesthetic (if needed). The endoscope was passed into each nostril and each nasal cavity was visualized.  On each side the nasal cavity, sinuses (if open), turbinates, middle and superior meatus, sphenoethmoidal recess and septum were examined with the findings described below. At the end of the examination, the scope was removed. The patient tolerated the procedure well with no complications.       Endoscopic Sinonasal Exam Findings:  -     The right side has moderate edema  -     The left side has moderate edema  -     Nasal secretions: thin mucous bilaterally  -     Nasal septum: no significant " deviation or perforation appreciated   -     Inferior turbinate: hypertrophy or edema (Severe) bilaterally  -     Middle turbinate: hypertrophy or edema (Mild) bilaterally  -     Other findings: none    Total Nasal Endoscopic Polyp Score Reference Guide        Assessment & Plan:   Moose was seen today for follow-up.    Diagnoses and all orders for this visit:    Nasal obstruction  -     Case Request Operating Room: CAUTERIZATION, MUCOSA, NASAL TURBINATE    Nasal turbinate hypertrophy  Comments:  Severe bilateral  No improvement despite prolonged nasal steroids  Recommend SMRIT  Orders:  -     Case Request Operating Room: CAUTERIZATION, MUCOSA, NASAL TURBINATE    Non-seasonal allergic rhinitis, unspecified trigger  Comments:  Dust mite(F 10x11 and P 11x12), Cockroach 7x7  Xyzal, Atrovent, Fluticasone  Follows in Allergy Clinic    We discussed at length the risk of surgery including, but not limited to, recurrence of disease, bleeding, infection, septal perforation, tooth or cheek numbness, vision changes, orbital injury, CSF leak and changes in smell.  The patient had opportunity to ask questions and I answered all of them to their satisfaction.  We will proceed as planned.          Thank you for allowing me to participate in the care of Moose.    León Jones MD      Answers submitted by the patient for this visit:  Review of Symptoms Questionnaire  (Submitted on 9/1/2022)  Fatigue (Tiredness)?: Yes  sinus pressure : Yes  Sinus infection(s)?: Yes  postnasal drip: Yes  None of these : Yes  Snoring?: Yes  wheezing: Yes  cough: Yes  None of these : Yes  None of these: Yes  None of these: Yes  None of these: Yes  None of these : Yes  Seasonal Allergies?: Yes  None of these : Yes  None of these: Yes  None of these: Yes  None of these: Yes

## 2022-09-29 NOTE — TELEPHONE ENCOUNTER
Pre op instructions reviewed with patient per phone.      To confirm, your doctor has instructed you: Surgery is scheduled for 10/4/22.     Pre admit office will call the afternoon prior to surgery between 1PM and 3PM with arrival time.    Surgery will be at [facility],  The address is [address]. [place]  [id number].      IMPORTANT INSTRUCTIONS!    Do not eat or drink after 12 midnight, including water. Do not smoke or use chewing tobacco after 12 midnight  OK to brush teeth, but no gum, candy, or mints!      Take only these medicines with a small swallow of water-morning of surgery.     Albuterol       ____ Stop Aspirin, Ibuprofen, Motrin and Aleve at least 5-7 days before surgery, unless otherwise instructed by your doctor, or the nurse.   You MAY use Tylenol/acetaminophen until day of surgery.      ____  If you take diabetic medication, do NOT take morning of surgery unless instructed by Doctor. Metformin must be stopped 24 hrs prior to surgery time.       ____ Stop taking any Fish Oil supplements or Vitamins at least 5 days prior to surgery, unless instructed otherwise by your Doctor.     Bathing Instructions: The night before surgery and the morning prior to coming to the hospital:    - Shower & rinse your body as usual with anti-bacterial Soap (Dial or Lever 2000)   -Hibiclens (if indicated) use AFTER anti-bacterial soap; 1 packet PM/1 packet in AM on surgical site only   -Do not use hibiclens on your head, face, or genitals.    -Do not wash with anti-bacterial soap after you use the hibiclens.    -Do not shave surgical site 5-7 days prior to surgery.    -Pubic hair 7 days prior to surgery (gyn pt's).      Pediatric patients do not need to use anti-bacterial soap or Hibiclens.             After Bathing:   __ No powder, lotions, creams, or body spray to skin     __No deodorant for any breast procedure, PORT, or upper arm surgery     __ No makeup, mascara, nail polish or artificial nails        **SURGERY WILL BE CANCELLED IF ARTIFICIAL/NAIL POLISH IS PRESENT!!!**    __ Please remove all piercings and leave all jewelry at home.    **SURGERY WILL BE CANCELLED IF PIERCINGS ARE PRESENT!!!**      __ Dentures, Hearing Aids and Contact Lens need to be removed prior to the start of surgery.      __ Wear clean, loose-fitting clothing. Allow for dressings/bandages/surgical equipment     __ You must have transportation, and they MUST stay the entire time.         [facility] Visitor/Ride Policy:   Only 1 adult allowed (over the age of 18) to accompany you into Pre-op/Recovery Surgery Dept and must stay through the entire length of admission.     Must have a ride home from a responsible adult that you know and trust.      Pediatric Patients are allowed 2 adult visitors.     Medical Transport, Uber or Lyft can only be used if patient has a responsible adult to accompany them during ride home.           Post-Op Instructions: You will receive surgery post-op instructions by your Discharge Nurse prior to going home.     Surgical Site Infection:   Prevention of surgical site infections:   -Keep incisions clean and dry.   -Do not soak/submerge incisions in water until completely healed.   -Do not apply lotions, powders, creams, or deodorants to site.   -Always make sure hands are cleaned with antibacterial soap/ alcohol-based   prior to touching the surgical site.       Signs and symptoms:               -Redness and pain around the area where you had surgery               -Drainage of cloudy fluid from your surgical wound               -Fever over 100.4 or chills     >>>Call Surgeon office/on-call Surgeon if you experience any of these signs & symptoms post-surgery.        *Please Call [facility] Pre-Admissions Department with surgery instruction questions at [phone].     *Insurance Questions, please call [phone] or [phone]    Spoke about pre op process and surgery instructions, verbalized  understanding.

## 2022-09-30 DIAGNOSIS — Z01.818 PREOP EXAMINATION: Primary | ICD-10-CM

## 2022-10-03 ENCOUNTER — LAB VISIT (OUTPATIENT)
Dept: LAB | Facility: HOSPITAL | Age: 27
End: 2022-10-03
Attending: NURSE PRACTITIONER
Payer: COMMERCIAL

## 2022-10-03 DIAGNOSIS — Z01.818 PREOP EXAMINATION: ICD-10-CM

## 2022-10-03 LAB
ALBUMIN SERPL BCP-MCNC: 3.3 G/DL (ref 3.5–5.2)
ALP SERPL-CCNC: 43 U/L (ref 55–135)
ALT SERPL W/O P-5'-P-CCNC: 14 U/L (ref 10–44)
ANION GAP SERPL CALC-SCNC: 11 MMOL/L (ref 8–16)
AST SERPL-CCNC: 16 U/L (ref 10–40)
BASOPHILS # BLD AUTO: 0.03 K/UL (ref 0–0.2)
BASOPHILS NFR BLD: 0.4 % (ref 0–1.9)
BILIRUB SERPL-MCNC: 0.5 MG/DL (ref 0.1–1)
BUN SERPL-MCNC: 8 MG/DL (ref 6–20)
CALCIUM SERPL-MCNC: 8.8 MG/DL (ref 8.7–10.5)
CHLORIDE SERPL-SCNC: 107 MMOL/L (ref 95–110)
CO2 SERPL-SCNC: 17 MMOL/L (ref 23–29)
CREAT SERPL-MCNC: 0.8 MG/DL (ref 0.5–1.4)
DIFFERENTIAL METHOD: ABNORMAL
EOSINOPHIL # BLD AUTO: 0.5 K/UL (ref 0–0.5)
EOSINOPHIL NFR BLD: 7.3 % (ref 0–8)
ERYTHROCYTE [DISTWIDTH] IN BLOOD BY AUTOMATED COUNT: 12.6 % (ref 11.5–14.5)
EST. GFR  (NO RACE VARIABLE): >60 ML/MIN/1.73 M^2
GLUCOSE SERPL-MCNC: 73 MG/DL (ref 70–110)
HCT VFR BLD AUTO: 40.5 % (ref 37–48.5)
HGB BLD-MCNC: 12.7 G/DL (ref 12–16)
IMM GRANULOCYTES # BLD AUTO: 0.01 K/UL (ref 0–0.04)
IMM GRANULOCYTES NFR BLD AUTO: 0.1 % (ref 0–0.5)
LYMPHOCYTES # BLD AUTO: 3 K/UL (ref 1–4.8)
LYMPHOCYTES NFR BLD: 42.4 % (ref 18–48)
MCH RBC QN AUTO: 26.2 PG (ref 27–31)
MCHC RBC AUTO-ENTMCNC: 31.4 G/DL (ref 32–36)
MCV RBC AUTO: 84 FL (ref 82–98)
MONOCYTES # BLD AUTO: 0.4 K/UL (ref 0.3–1)
MONOCYTES NFR BLD: 5.3 % (ref 4–15)
NEUTROPHILS # BLD AUTO: 3.1 K/UL (ref 1.8–7.7)
NEUTROPHILS NFR BLD: 44.5 % (ref 38–73)
NRBC BLD-RTO: 0 /100 WBC
PLATELET # BLD AUTO: 272 K/UL (ref 150–450)
PMV BLD AUTO: 10.5 FL (ref 9.2–12.9)
POTASSIUM SERPL-SCNC: 3.8 MMOL/L (ref 3.5–5.1)
PROT SERPL-MCNC: 6.4 G/DL (ref 6–8.4)
RBC # BLD AUTO: 4.85 M/UL (ref 4–5.4)
SODIUM SERPL-SCNC: 135 MMOL/L (ref 136–145)
WBC # BLD AUTO: 7 K/UL (ref 3.9–12.7)

## 2022-10-03 PROCEDURE — 85025 COMPLETE CBC W/AUTO DIFF WBC: CPT | Performed by: NURSE PRACTITIONER

## 2022-10-03 PROCEDURE — 36415 COLL VENOUS BLD VENIPUNCTURE: CPT | Mod: PO | Performed by: NURSE PRACTITIONER

## 2022-10-03 PROCEDURE — 80053 COMPREHEN METABOLIC PANEL: CPT | Performed by: NURSE PRACTITIONER

## 2022-10-04 ENCOUNTER — HOSPITAL ENCOUNTER (OUTPATIENT)
Facility: HOSPITAL | Age: 27
Discharge: HOME | End: 2022-10-04
Attending: OTOLARYNGOLOGY | Admitting: OTOLARYNGOLOGY
Payer: COMMERCIAL

## 2022-10-04 ENCOUNTER — ANESTHESIA (OUTPATIENT)
Dept: SURGERY | Facility: HOSPITAL | Age: 27
End: 2022-10-04
Payer: COMMERCIAL

## 2022-10-04 VITALS
OXYGEN SATURATION: 98 % | TEMPERATURE: 98 F | SYSTOLIC BLOOD PRESSURE: 137 MMHG | HEART RATE: 89 BPM | HEIGHT: 69 IN | BODY MASS INDEX: 26.3 KG/M2 | RESPIRATION RATE: 20 BRPM | WEIGHT: 177.56 LBS | DIASTOLIC BLOOD PRESSURE: 86 MMHG

## 2022-10-04 DIAGNOSIS — J34.89 NASAL OBSTRUCTION: ICD-10-CM

## 2022-10-04 DIAGNOSIS — J34.3 NASAL TURBINATE HYPERTROPHY: Primary | ICD-10-CM

## 2022-10-04 LAB
B-HCG UR QL: NEGATIVE
CTP QC/QA: YES

## 2022-10-04 PROCEDURE — 63600175 PHARM REV CODE 636 W HCPCS: Performed by: ANESTHESIOLOGY

## 2022-10-04 PROCEDURE — C9046 COCAINE HCL NASAL SOLUTION: HCPCS | Mod: TB | Performed by: OTOLARYNGOLOGY

## 2022-10-04 PROCEDURE — 36000707: Performed by: OTOLARYNGOLOGY

## 2022-10-04 PROCEDURE — 71000015 HC POSTOP RECOV 1ST HR: Performed by: OTOLARYNGOLOGY

## 2022-10-04 PROCEDURE — 81025 URINE PREGNANCY TEST: CPT | Performed by: OTOLARYNGOLOGY

## 2022-10-04 PROCEDURE — 25000003 PHARM REV CODE 250: Mod: TB | Performed by: OTOLARYNGOLOGY

## 2022-10-04 PROCEDURE — 37000009 HC ANESTHESIA EA ADD 15 MINS: Performed by: OTOLARYNGOLOGY

## 2022-10-04 PROCEDURE — 25000003 PHARM REV CODE 250: Performed by: NURSE ANESTHETIST, CERTIFIED REGISTERED

## 2022-10-04 PROCEDURE — 63600175 PHARM REV CODE 636 W HCPCS: Performed by: NURSE ANESTHETIST, CERTIFIED REGISTERED

## 2022-10-04 PROCEDURE — 36000706: Performed by: OTOLARYNGOLOGY

## 2022-10-04 PROCEDURE — 30802 PR RF ABLATION INF TURBINATE SUBMUCOSAL: ICD-10-PCS | Mod: ,,, | Performed by: OTOLARYNGOLOGY

## 2022-10-04 PROCEDURE — 27201423 OPTIME MED/SURG SUP & DEVICES STERILE SUPPLY: Performed by: OTOLARYNGOLOGY

## 2022-10-04 PROCEDURE — D9220A PRA ANESTHESIA: ICD-10-PCS | Mod: CRNA,,, | Performed by: NURSE ANESTHETIST, CERTIFIED REGISTERED

## 2022-10-04 PROCEDURE — D9220A PRA ANESTHESIA: Mod: ANES,,, | Performed by: ANESTHESIOLOGY

## 2022-10-04 PROCEDURE — 37000008 HC ANESTHESIA 1ST 15 MINUTES: Performed by: OTOLARYNGOLOGY

## 2022-10-04 PROCEDURE — D9220A PRA ANESTHESIA: Mod: CRNA,,, | Performed by: NURSE ANESTHETIST, CERTIFIED REGISTERED

## 2022-10-04 PROCEDURE — 71000033 HC RECOVERY, INTIAL HOUR: Performed by: OTOLARYNGOLOGY

## 2022-10-04 PROCEDURE — 30802 ABLATE INF TURBINATE SUBMUC: CPT | Mod: ,,, | Performed by: OTOLARYNGOLOGY

## 2022-10-04 PROCEDURE — D9220A PRA ANESTHESIA: ICD-10-PCS | Mod: ANES,,, | Performed by: ANESTHESIOLOGY

## 2022-10-04 RX ORDER — FLUCONAZOLE 100 MG/1
100 TABLET ORAL DAILY
Qty: 10 TABLET | Refills: 0 | Status: SHIPPED | OUTPATIENT
Start: 2022-10-04 | End: 2022-10-14

## 2022-10-04 RX ORDER — MEPERIDINE HYDROCHLORIDE 25 MG/ML
12.5 INJECTION INTRAMUSCULAR; INTRAVENOUS; SUBCUTANEOUS ONCE
Status: DISCONTINUED | OUTPATIENT
Start: 2022-10-04 | End: 2022-10-04 | Stop reason: HOSPADM

## 2022-10-04 RX ORDER — ONDANSETRON 2 MG/ML
4 INJECTION INTRAMUSCULAR; INTRAVENOUS ONCE AS NEEDED
Status: DISCONTINUED | OUTPATIENT
Start: 2022-10-04 | End: 2022-10-04 | Stop reason: HOSPADM

## 2022-10-04 RX ORDER — SODIUM CHLORIDE, SODIUM LACTATE, POTASSIUM CHLORIDE, CALCIUM CHLORIDE 600; 310; 30; 20 MG/100ML; MG/100ML; MG/100ML; MG/100ML
INJECTION, SOLUTION INTRAVENOUS CONTINUOUS
Status: DISCONTINUED | OUTPATIENT
Start: 2022-10-04 | End: 2022-10-04 | Stop reason: HOSPADM

## 2022-10-04 RX ORDER — DEXMEDETOMIDINE HYDROCHLORIDE 100 UG/ML
INJECTION, SOLUTION INTRAVENOUS
Status: DISCONTINUED | OUTPATIENT
Start: 2022-10-04 | End: 2022-10-04

## 2022-10-04 RX ORDER — ACETAMINOPHEN 10 MG/ML
INJECTION, SOLUTION INTRAVENOUS
Status: DISCONTINUED | OUTPATIENT
Start: 2022-10-04 | End: 2022-10-04

## 2022-10-04 RX ORDER — COCAINE HYDROCHLORIDE 40 MG/ML
SOLUTION NASAL
Status: DISCONTINUED
Start: 2022-10-04 | End: 2022-10-04 | Stop reason: HOSPADM

## 2022-10-04 RX ORDER — OXYCODONE AND ACETAMINOPHEN 5; 325 MG/1; MG/1
1 TABLET ORAL EVERY 4 HOURS PRN
Qty: 30 TABLET | Refills: 0 | Status: SHIPPED | OUTPATIENT
Start: 2022-10-04 | End: 2022-10-11

## 2022-10-04 RX ORDER — OXYMETAZOLINE HCL 0.05 %
SPRAY, NON-AEROSOL (ML) NASAL
Status: DISCONTINUED | OUTPATIENT
Start: 2022-10-04 | End: 2022-10-04 | Stop reason: HOSPADM

## 2022-10-04 RX ORDER — ONDANSETRON HYDROCHLORIDE 8 MG/1
8 TABLET, FILM COATED ORAL EVERY 8 HOURS PRN
Qty: 15 TABLET | Refills: 1 | Status: SHIPPED | OUTPATIENT
Start: 2022-10-04

## 2022-10-04 RX ORDER — HYDROCODONE BITARTRATE AND ACETAMINOPHEN 5; 325 MG/1; MG/1
1 TABLET ORAL
Status: DISCONTINUED | OUTPATIENT
Start: 2022-10-04 | End: 2022-10-04 | Stop reason: HOSPADM

## 2022-10-04 RX ORDER — ONDANSETRON 2 MG/ML
INJECTION INTRAMUSCULAR; INTRAVENOUS
Status: DISCONTINUED | OUTPATIENT
Start: 2022-10-04 | End: 2022-10-04

## 2022-10-04 RX ORDER — PROPOFOL 10 MG/ML
VIAL (ML) INTRAVENOUS
Status: DISCONTINUED | OUTPATIENT
Start: 2022-10-04 | End: 2022-10-04

## 2022-10-04 RX ORDER — AMOXICILLIN AND CLAVULANATE POTASSIUM 875; 125 MG/1; MG/1
1 TABLET, FILM COATED ORAL EVERY 12 HOURS
Qty: 20 TABLET | Refills: 0 | Status: SHIPPED | OUTPATIENT
Start: 2022-10-04 | End: 2022-10-14

## 2022-10-04 RX ORDER — ETONOGESTREL 68 MG/1
68 IMPLANT SUBCUTANEOUS ONCE
COMMUNITY

## 2022-10-04 RX ORDER — LIDOCAINE HYDROCHLORIDE 20 MG/ML
INJECTION, SOLUTION EPIDURAL; INFILTRATION; INTRACAUDAL; PERINEURAL
Status: DISCONTINUED | OUTPATIENT
Start: 2022-10-04 | End: 2022-10-04

## 2022-10-04 RX ORDER — DEXAMETHASONE SODIUM PHOSPHATE 4 MG/ML
INJECTION, SOLUTION INTRA-ARTICULAR; INTRALESIONAL; INTRAMUSCULAR; INTRAVENOUS; SOFT TISSUE
Status: DISCONTINUED | OUTPATIENT
Start: 2022-10-04 | End: 2022-10-04

## 2022-10-04 RX ORDER — ROCURONIUM BROMIDE 10 MG/ML
INJECTION, SOLUTION INTRAVENOUS
Status: DISCONTINUED | OUTPATIENT
Start: 2022-10-04 | End: 2022-10-04

## 2022-10-04 RX ORDER — SUCCINYLCHOLINE CHLORIDE 20 MG/ML
INJECTION INTRAMUSCULAR; INTRAVENOUS
Status: DISCONTINUED | OUTPATIENT
Start: 2022-10-04 | End: 2022-10-04

## 2022-10-04 RX ORDER — MIDAZOLAM HYDROCHLORIDE 1 MG/ML
INJECTION, SOLUTION INTRAMUSCULAR; INTRAVENOUS
Status: DISCONTINUED | OUTPATIENT
Start: 2022-10-04 | End: 2022-10-04

## 2022-10-04 RX ORDER — FENTANYL CITRATE 50 UG/ML
25 INJECTION, SOLUTION INTRAMUSCULAR; INTRAVENOUS EVERY 5 MIN PRN
Status: DISCONTINUED | OUTPATIENT
Start: 2022-10-04 | End: 2022-10-04 | Stop reason: HOSPADM

## 2022-10-04 RX ORDER — COCAINE HYDROCHLORIDE 40 MG/ML
SOLUTION NASAL
Status: DISCONTINUED | OUTPATIENT
Start: 2022-10-04 | End: 2022-10-04 | Stop reason: HOSPADM

## 2022-10-04 RX ORDER — ALBUTEROL SULFATE 0.83 MG/ML
2.5 SOLUTION RESPIRATORY (INHALATION) EVERY 4 HOURS PRN
Status: DISCONTINUED | OUTPATIENT
Start: 2022-10-04 | End: 2022-10-04 | Stop reason: HOSPADM

## 2022-10-04 RX ORDER — OXYMETAZOLINE HCL 0.05 %
SPRAY, NON-AEROSOL (ML) NASAL
Status: DISCONTINUED
Start: 2022-10-04 | End: 2022-10-04 | Stop reason: HOSPADM

## 2022-10-04 RX ORDER — FENTANYL CITRATE 50 UG/ML
INJECTION, SOLUTION INTRAMUSCULAR; INTRAVENOUS
Status: DISCONTINUED | OUTPATIENT
Start: 2022-10-04 | End: 2022-10-04

## 2022-10-04 RX ORDER — DIPHENHYDRAMINE HYDROCHLORIDE 50 MG/ML
25 INJECTION INTRAMUSCULAR; INTRAVENOUS EVERY 6 HOURS PRN
Status: DISCONTINUED | OUTPATIENT
Start: 2022-10-04 | End: 2022-10-04 | Stop reason: HOSPADM

## 2022-10-04 RX ADMIN — SUCCINYLCHOLINE CHLORIDE 120 MG: 20 INJECTION, SOLUTION INTRAMUSCULAR; INTRAVENOUS at 11:10

## 2022-10-04 RX ADMIN — MIDAZOLAM 2 MG: 1 INJECTION INTRAMUSCULAR; INTRAVENOUS at 11:10

## 2022-10-04 RX ADMIN — LIDOCAINE HYDROCHLORIDE 70 MG: 20 INJECTION, SOLUTION EPIDURAL; INFILTRATION; INTRACAUDAL; PERINEURAL at 11:10

## 2022-10-04 RX ADMIN — ONDANSETRON 4 MG: 2 INJECTION, SOLUTION INTRAMUSCULAR; INTRAVENOUS at 12:10

## 2022-10-04 RX ADMIN — DEXMEDETOMIDINE HYDROCHLORIDE 4 MCG: 100 INJECTION, SOLUTION INTRAVENOUS at 01:10

## 2022-10-04 RX ADMIN — ROCURONIUM BROMIDE 5 MG: 10 INJECTION, SOLUTION INTRAVENOUS at 11:10

## 2022-10-04 RX ADMIN — SODIUM CHLORIDE, SODIUM LACTATE, POTASSIUM CHLORIDE, AND CALCIUM CHLORIDE: .6; .31; .03; .02 INJECTION, SOLUTION INTRAVENOUS at 09:10

## 2022-10-04 RX ADMIN — FENTANYL CITRATE 50 MCG: 50 INJECTION, SOLUTION INTRAMUSCULAR; INTRAVENOUS at 01:10

## 2022-10-04 RX ADMIN — DEXAMETHASONE SODIUM PHOSPHATE 12 MG: 4 INJECTION, SOLUTION INTRA-ARTICULAR; INTRALESIONAL; INTRAMUSCULAR; INTRAVENOUS; SOFT TISSUE at 12:10

## 2022-10-04 RX ADMIN — ACETAMINOPHEN 1000 MG: 10 INJECTION, SOLUTION INTRAVENOUS at 12:10

## 2022-10-04 RX ADMIN — FENTANYL CITRATE 50 MCG: 50 INJECTION, SOLUTION INTRAMUSCULAR; INTRAVENOUS at 12:10

## 2022-10-04 RX ADMIN — FENTANYL CITRATE 100 MCG: 50 INJECTION, SOLUTION INTRAMUSCULAR; INTRAVENOUS at 11:10

## 2022-10-04 RX ADMIN — DEXTROSE 2 G: 50 INJECTION, SOLUTION INTRAVENOUS at 12:10

## 2022-10-04 RX ADMIN — PROPOFOL 170 MG: 10 INJECTION, EMULSION INTRAVENOUS at 11:10

## 2022-10-04 NOTE — DISCHARGE SUMMARY
[facility]  Discharge Note  Short Stay    Procedure(s) (LRB):  CAUTERIZATION, MUCOSA, NASAL TURBINATE (Bilateral)      OUTCOME: Patient tolerated treatment/procedure well without complication and is now ready for discharge.    DISPOSITION: Home or Self Care    FINAL DIAGNOSIS:  <principal problem not specified>    FOLLOWUP: In clinic    DISCHARGE INSTRUCTIONS:  No discharge procedures on file.     TIME SPENT ON DISCHARGE:  minutes

## 2022-10-04 NOTE — DISCHARGE INSTRUCTIONS
ENDOSCOPIC SINUS SURGERY POST-OP INSTRUCTIONS    Rarely, is nasal packing (absorbent bandage) required (less than 5% of the time). This will be remove before you go home, or in some cases, on your first post-up appointment.    Slight trickling of blood in the back of your throat, or on your drip pad in front of your nose, and/or crusting of secretions for the first few days, is to be expected. You should change your drip pad periodically during the day as needed. This may be necessary for the first 3-5 days after surgery. You may also clean the hair-baring area of the nose daily, as needed, using a Q-tip and hydrogen peroxide. Keep your fingers out of your nose.    Diet: You may experience nausea after surgery. Avoid heavy meals on that day. Wait 24 hours to gradually resume your normal diet. Drink plenty of fluids to maintain hydration (6-8 glasses daily minimum). Avoid alcohol or caffeine the first week after surgery.    Restricted Activities: the first week after surgery, your sinuses will feel very full and congested, this is due to the accumulation of dried blood and/or mucus crust. Do not plan to blow your nose, bend over or lift heavy objects (over 10 lbs) for the first week after surgery. This may result in a nose bleed. Rest and do not exert or overheat yourself with exercise or other physical activity.    Post-Operative pain: pain medication (Percocet or Acetaminophen with codeine) and/or anti-inflammatory medication (prednisone) may be ordered for significant post-operative pain, (FOR HEADACHES). You can still take extra-strength acetaminophen if the pain is not too uncomfortable, to avoid the occasional side-affects of nausea, constipation, or grogginess, associated with all narcotics. Use stool softener such as: dulcolax or Miralax to prevent constipation. Do not drive or handle heavy machinery while on narcotics. Throat discomfort is not too uncommon after endotracheal intubation during general  anesthesia. This should resolve on it's own within 5 days. Throat lozenges or gargles tend to soothe the discomfort. Plane travel is allowed after your first post-op visit.    Do not take aspirin or anti-coagulant therapy 7-10 days after surgery unless otherwise indicated by your surgeon at time of discharge. If you are on Coumadin you will likely restart your normal dosage 24 hours after surgery. However, check with your doctor first.    Post-operative Office Visits: The first visit will be 7-10 days after surgery. At this time the physician will clean your sinus cavity by removing dried blood and/or mucus crusting to promote healing, to minimize the chance for secondary bacterial infection, and relieves congestion and/or headaches. It is suggested that you take a couple of narcotics pills 1 hour prior to your first post op appointment. This is especially important for your first debridement, when your internal nose is still slightly swollen and tender.    If indicated by the physician, patient may start nasal irrigations, such as Sinus Rinse, or resume any other previous treatment.    Signs and symptoms that MUST be reported to the physician/nurse immediately by [phone]. *Fever over 102 degree F. *Persistent bleeding with more that ¼ cup of bright red blood within a short period of time (half hour). *Severe pain unrelieved by prescribed medication. *Mental or visual changes (confusion, slurred speech, double- vision, visual loss).    * If you experience difficulty breathing, shortness of breath, or severe bleeding, call 911 or go to the nearest emergency room.        PLEASE PERFORM SINUS RINSES 5-7 TIMES DAILY UNTIL YOUR FIRST POSTOPERATIVE VISIT.          DIRECTIONS FOR SINUS SALINE RINSE To see demonstration: Enter http://www.[email or web address].com/watch?[email or web address] into the browser address box, or go to You tube, and under the search box, enter sinus rinse. Click on [employer] Sinus Rinse Video    Step  1    Step 1 Please wash your hands. Fill the clean bottle with the designated volume of warm distilled water, filtered water or previously boiled water. You may warm the water in a microwave but we recommend that you warm it in increments of five seconds. This is to avoid excessive heating of the water and damage to the device or scalding your nasal passage.    Step 2    Step 2 Cut the SINUS RINSE mixture packet at the corner and pour its contents into the bottle. Tighten the cap & tube on the bottle securely, place one finger over the tip of the cap and shake the bottle gently to dissolve the mixture.      Step 3    Step 3 Standing in front of a sink, bend forward to your comfort level and tilt your head down. Keeping your mouth open without holding your breath, place cap snugly against your nasal passage and SQUEEZE BOTTLE GENTLY until the solution starts draining from the OPPOSITE nasal passage or from your mouth. Keep squeezing the bottle GENTLY until at least 1/4 to 1/2 (60 to 120 mL) of the bottle is used for a proper rinse. Do not swallow the solution.    Step 4  Blow your nose gently, without pinching your nose completely because this will apply pressure on the  eardrums. If tolerable, sniff in any residual solution remaining in the nasal passage once or twice prior to  blowing your nose as this may clean out the posterior nasopharyngeal area (the area at the back of your  nasal passage). Some solution will reach the back of the throat, so please spit it out. To help improve  drainage of any residual solution, blow your nose gently while tilting your head to the opposite side of  the nasal passage that you just rinsed.    Step 5  Now repeat steps 3 & 4 for your other nasal passage.    Step 6   Air dry the Sinus Rinse bottle, cap, and tube on a clean paper towel, a glass plate to store the bottle cap and tube. If there is any solution leftover, please discard it. We recommend you make a fresh solution each  time you rinse. Rinse 5 times each day, OR as directed by your physician. Warnings: DO NOT RINSE IF NASAL PASSAGE IS COMPLETELY BLOCKED OR IF YOU HAVE AN EAR INFECTION OR BLOCKED EARS. If you have had ear surgery, please contact your physician prior to irrigation. If you experience any pressure in your ears, stop the rinse and get further directions from your physician or contact our office during regular business hours. To avoid any ear discomfort: Heat the solution to lukewarm, do not use hot, boiling or cold water. Keep your mouth open. Do not hold your breath and if possible make the sound GERDA....GERDA... Make sure to take the position as shown. Gently squeeze 1/4 of the bottle at a time (60mL / 2 ounces). Stop the rinse if you feel any solution sensation near the ears. You may rinse with a partially blocked nasal passage. Please do not use for any other purposes. Please rinse at least ONE HOUR PRIOR to bedtime, in order to avoid any residual solution dripping in the throat.    >> Before using the SINUS RINSE kit, please inspect the cap, tube and bottle carefully for wear and tear. If any of the components appear discolored or cracked, please contact [employer] to obtain a replacement. You must follow the cleaning instructions provided in this brochure or cleaning instruction card prior to each use.    >> The SINUS RINSE bottle and mixture are to be used only for nasalirrigation. Do not use for any other purposes.    >> We recommend that you use the rinse ONE HOUR PRIOR to bedtime in order to avoid any residual solution dripping in the throat.    Tips to avoid ear discomfort while rinsing:  If you have had ear surgery, please contact your physician prior to irrigation. Do not use if you have an ear infection or blocked ears. Rinse with lukewarm water. Keep your mouth open. Do not hold your breath while rinsing. While rinsing, make sure to tilt your. Gently squeeze the bottle while rinsing; do not squeeze the  bottle very forcefully. Stop the rinse if you feel a sensation of fluid near your ears.    Tips to avoid unexpected drainage after rinsing:  In rare situations, especially if you have had sinus surgery, the saline solution can pool in the sinus cavities and nasal passages and then drip from your nostrils hours after rinsing. To avoid this harmless but annoying inconvenience, take one extra step after rinsing: lean forward, tilt your head sideways and gently blow your nose. Then, tilt your head to the other side and blow again. You may need to repeat this several times. This will help rid your nasal passages of any excess mucus and remaining saline solution. If you find yourself experiencing delayed drainage often, do not rinse right before leaving your house or going to bed.

## 2022-10-04 NOTE — ANESTHESIA PROCEDURE NOTES
Intubation    Date/Time: 10/4/2022 12:01 PM  Performed by: Chintan Arango CRNA  Authorized by: Danuta Mckeon MD     Intubation:     Induction:  Intravenous    Intubated:  Postinduction    Mask Ventilation:  Easy mask    Attempts:  1    Attempted By:  CRNA    Method of Intubation:  Direct    Blade:  Martines 2    Difficult Airway Encountered?: No      Complications:  None    Airway Device:  Oral endotracheal tube    Airway Device Size:  7.0    Style/Cuff Inflation:  Cuffed (inflated to minimal occlusive pressure)    Tube secured:  22    Secured at:  The lips    Placement Verified By:  Capnometry    Complicating Factors:  None    Findings Post-Intubation:  BS equal bilateral and atraumatic/condition of teeth unchanged

## 2022-10-04 NOTE — ANESTHESIA POSTPROCEDURE EVALUATION
Anesthesia Post Evaluation    Patient: Moose Fong    Procedure(s) Performed: Procedure(s) (LRB):  CAUTERIZATION, MUCOSA, NASAL TURBINATE (Bilateral)    Final Anesthesia Type: general      Patient location during evaluation: PACU  Patient participation: Yes- Able to Participate  Level of consciousness: awake and alert and oriented  Post-procedure vital signs: reviewed and stable  Pain management: adequate  Airway patency: patent    PONV status at discharge: No PONV  Anesthetic complications: no      Cardiovascular status: blood pressure returned to baseline, stable and hemodynamically stable  Respiratory status: unassisted  Hydration status: euvolemic  Follow-up not needed.          Vitals Value Taken Time   /84 10/04/22 1415   Temp 36.6 °C (97.9 °F) 10/04/22 1315   Pulse 87 10/04/22 1419   Resp 15 10/04/22 1419   SpO2 98 % 10/04/22 1419   Vitals shown include unvalidated device data.      No case tracking events are documented in the log.      Pain/Terrence Score: Terrence Score: 10 (10/4/2022  2:15 PM)

## 2022-10-04 NOTE — OP NOTE
Operative Note       Surgery Date: 10/4/2022     Surgeon: León Jones MD    Assistant:  None    Implants:  None    Pre-op Diagnosis:  Nasal obstruction [J34.89]  Nasal turbinate hypertrophy [J34.3]    Post-op Diagnosis: same    Anesthesia: GETA    Technical Procedures Used:     Submucosal resection of inferior turbinates bilateral    Procedure in Detail/Findings:    Severe nasal turbinate hypertrophy with significant soft tissue component as well as a very large bony component    Complications: No    Estimated Blood Loss:  20 cc           Specimens (From admission, onward)      None            Justification for the operation:     This is a 22-year-old female with nasal obstruction and has been on maximal medical therapy as outlined in preoperative clinic notes.  On exam she had severe nasal turbinate hypertrophy with no significant septal deviation.  I recommended turbinate hypertrophy.  Risks benefits alternatives were discussed in the preoperative clinic visit.    Procedure in Detail:      Patient was brought into the operating room placed under general endotracheal anesthesia.  The nasal cavities were decongested with topical cocaine.  3 cc lidocaine 1% with epinephrine were injected into the right inferior turbinate.  The [employer] 2mm turbinate blade was used to make an incision in the head of the inferior turbinate.  The instrument was then tunneled submucosally along the inferior turbinate and the instrument was activated.  Several passes superiorly and inferiorly were made until there was significant reduction of the tissue.  At this point upon further inspection it was apparent that there was still significant redundant tissue.  Therefore changed to the quad cut [employer] endoscopy played.  I removed this significant additional amount of soft tissue then raised flap over the bone.  Using straight through cup forceps, the underlying bone was removed in the flap placed back in to position.  The head and the  tail the turbinate recurrent cauterized with the suction Bovie.  The nasal cavity was suctioned free of blood and saline and found to be hemostatic.  The same procedure was performed on the left side.  At resolution of the case, both nasal cavities were hemostatic and widely patent.             Disposition: ICU - extubated and stable.           Condition: Good    Attestation:  I performed the procedure.

## 2022-10-04 NOTE — TRANSFER OF CARE
"Anesthesia Transfer of Care Note    Patient: Moose Fong    Procedure(s) Performed: Procedure(s) (LRB):  CAUTERIZATION, MUCOSA, NASAL TURBINATE (Bilateral)    Patient location: PACU    Anesthesia Type: general    Transport from OR: Transported from OR on room air with adequate spontaneous ventilation    Post pain: adequate analgesia    Post assessment: no apparent anesthetic complications and tolerated procedure well    Post vital signs: stable    Level of consciousness: awake    Nausea/Vomiting: no nausea/vomiting    Complications: none    Transfer of care protocol was followed      Last vitals:   Visit Vitals  /86 (BP Location: Left arm, Patient Position: Lying)   Pulse 86   Temp 36.6 °C (97.9 °F) (Temporal)   Resp 20   Ht 5' 9" (1.753 m)   Wt 80.6 kg (177 lb 9.3 oz)   LMP  (LMP Unknown)   SpO2 (!) 93%   Breastfeeding No   BMI 26.22 kg/m²     "

## 2022-10-04 NOTE — PATIENT INSTRUCTIONS
Turbinate Reduction Post Operative Instructions    Rarely, is nasal packing (absorbent bandage) required (less than 5% of the time). This will be remove before you go home, or in some cases, on your first post-up appointment.  Frequently, splints are placed to hold the septum in position while it is healing.  This is usually removed at your first post operative visit.    Slight trickling of blood in the back of your throat, or on your drip pad in front of your nose, and/or crusting of secretions for the first few days, is to be expected. You should change your drip pad periodically during the day as needed. This may be necessary for the first 3-5 days after surgery. You may also clean the hair-baring area of the nose daily, as needed, using a Q-tip and hydrogen peroxide. Keep your fingers out of your nose.    Diet: You may experience nausea after surgery. Avoid heavy meals on that day. Wait 24 hours to gradually resume your normal diet. Drink plenty of fluids to maintain hydration (6-8 glasses daily minimum). Avoid alcohol or caffeine the first week after surgery.    Restricted Activities: the first week after surgery, your sinuses will feel very full and congested, this is due to the accumulation of dried blood and/or mucus crust. Do not plan to blow your nose, bend over or lift heavy objects (over 10 lbs) for the first week after surgery. This may result in a nose bleed. Rest and do not exert or overheat yourself with exercise or other physical activity.    Post-Operative pain: pain medication (Percocet or Acetaminophen with codeine) and/or anti-inflammatory medication (prednisone) may be ordered for significant post-operative pain, (FOR HEADACHES). You can still take extra-strength acetaminophen if the pain is not too uncomfortable, to avoid the occasional side-affects of nausea, constipation, or grogginess, associated with all narcotics. Use stool softener such as: dulcolax or Miralax to prevent constipation. Do  not drive or handle heavy machinery while on narcotics. Throat discomfort is not too uncommon after endotracheal intubation during general anesthesia. This should resolve on it's own within 5 days. Throat lozenges or gargles tend to soothe the discomfort. Plane travel is allowed after your first post-op visit.    Do not take aspirin or anti-coagulant therapy 7-10 days after surgery unless otherwise indicated by your surgeon at time of discharge. If you are on Coumadin you will likely restart your normal dosage 24 hours after surgery. However, check with your doctor first.    Post-operative Office Visits: The first visit will be 7-10 days after surgery. At this time the physician will clean your sinus cavity by removing dried blood and/or mucus crusting to promote healing, to minimize the chance for secondary bacterial infection, and relieves congestion and/or headaches. It is suggested that you take a couple of narcotics pills 1 hour prior to your appointment. This is especially important for your first debridement, when your internal nose is still slightly swollen and tender.    If indicated by the physician, patient may start nasal irrigations, such as Sinus Rinse, or resume any other previous treatment.    Signs and symptoms that MUST be reported to the physician/nurse immediately by [phone]. *Fever over 102 degree F. *Persistent bleeding with more that ¼ cup of bright red blood within a short period of time (half hour). *Severe pain unrelieved by prescribed medication. *Mental or visual changes (confusion, slurred speech, double- vision, visual loss).    * If you experience difficulty breathing, shortness of breath, or severe bleeding, call 911 or go to the nearest emergency room.        KEEPING THE NASAL CAVITIES VERY MOIST IS EXTREMELY IMPORTANT TO PREVENT CRUSTING AND SCARRING.  THE EASIEST WAY TO DO THIS IS WITH AN SALINE  USE THIS 5-7 TIMES DAILY FOR THE FIRST WEEK THEN 3-5 DAILY FOR THE  SECOND WEEK.  AFTER THIS USE THIS TWICE DAILY FOR A MONTH AFTER SURGERY.            DIRECTIONS FOR SINUS SALINE RINSE To see demonstration: Enter http://www.youtube.com/watch?v=[email or web address] into the browser address box, or go to You tube, and under the search box, enter sinus rinse. Click on [employer] Sinus Rinse Video    Step 1    Step 1 Please wash your hands. Fill the clean bottle with the designated volume of warm distilled water, filtered water or previously boiled water. You may warm the water in a microwave but we recommend that you warm it in increments of five seconds. This is to avoid excessive heating of the water and damage to the device or scalding your nasal passage.    Step 2    Step 2 Cut the SINUS RINSE mixture packet at the corner and pour its contents into the bottle. Tighten the cap & tube on the bottle securely, place one finger over the tip of the cap and shake the bottle gently to dissolve the mixture.      Step 3    Step 3 Standing in front of a sink, bend forward to your comfort level and tilt your head down. Keeping your mouth open without holding your breath, place cap snugly against your nasal passage and SQUEEZE BOTTLE GENTLY until the solution starts draining from the OPPOSITE nasal passage or from your mouth. Keep squeezing the bottle GENTLY until at least 1/4 to 1/2 (60 to 120 mL) of the bottle is used for a proper rinse. Do not swallow the solution.    Step 4    Blow your nose gently, without pinching your nose completely because this will apply pressure on the    eardrums. If tolerable, sniff in any residual solution remaining in the nasal passage once or twice prior to    blowing your nose as this may clean out the posterior nasopharyngeal area (the area at the back of your    nasal passage). Some solution will reach the back of the throat, so please spit it out. To help improve    drainage of any residual solution, blow your nose gently while tilting your head to the opposite  side of    the nasal passage that you just rinsed.    Step 5    Now repeat steps 3 & 4 for your other nasal passage.    Step 6 Air dry the Sinus Rinse bottle, cap, and tube on a clean paper towel, a glass plate to store the bottle cap and tube. If there is any solution leftover, please discard it. We recommend you make a fresh solution each time you rinse. Rinse 5 times each day, OR as directed by your physician. Warnings: DO NOT RINSE IF NASAL PASSAGE IS COMPLETELY BLOCKED OR IF YOU HAVE AN EAR INFECTION OR BLOCKED EARS. If you have had ear surgery, please contact your physician prior to irrigation. If you experience any pressure in your ears, stop the rinse and get further directions from your physician or contact our office during regular business hours. To avoid any ear discomfort: Heat the solution to lukewarm, do not use hot, boiling or cold water. Keep your mouth open. Do not hold your breath and if possible make the sound GERDA....GERDA... Make sure to take the position as shown. Gently squeeze 1/4 of the bottle at a time (60mL / 2 ounces). Stop the rinse if you feel any solution sensation near the ears. You may rinse with a partially blocked nasal passage. Please do not use for any other purposes. Please rinse at least ONE HOUR PRIOR to bedtime, in order to avoid any residual solution dripping in the throat.    >> Before using the SINUS RINSE kit, please inspect the cap, tube and bottle carefully for wear and    tear. If any of the components appear discolored or cracked, please contact [employer] to obtain a    replacement. You must follow the cleaning instructions provided in this brochure or cleaning instruction    card prior to each use.    >> The SINUS RINSE bottle and mixture are to be used only for nasalirrigation. Do not use for any other    purposes.    >> We recommend that you use the rinse ONE HOUR PRIOR to bedtime in order to avoid any residual    solution dripping in the throat.    Tips to avoid  ear discomfort while rinsing    If you have had ear surgery, please contact your physician prior to irrigation. Do not use if you have an    ear infection or blocked ears. Rinse with lukewarm water. Keep your mouth open. Do not hold your    breath while rinsing. While rinsing, make sure to tilt your. Gently squeeze the bottle while rinsing; do    not squeeze the bottle very forcefully. Stop the rinse if you feel a sensation of fluid near your ears.    Tips to avoid unexpected drainage after rinsing    In rare situations, especially if you have had sinus surgery, the saline solution can pool in the sinus    cavities and nasal passages and then drip from your nostrils hours after rinsing. To avoid this harmless    but annoying inconvenience, take one extra step after rinsing: lean forward, tilt your head sideways and    gently blow your nose. Then, tilt your head to the other side and blow again. You may need to repeat    this several times. This will help rid your nasal passages of any excess mucus and remaining saline    solution. If you find yourself experiencing delayed drainage often, do not rinse right before leaving your    house or going to bed.

## 2022-10-17 ENCOUNTER — OFFICE VISIT (OUTPATIENT)
Dept: OTOLARYNGOLOGY | Facility: CLINIC | Age: 27
End: 2022-10-17
Payer: COMMERCIAL

## 2022-10-17 VITALS
HEART RATE: 91 BPM | DIASTOLIC BLOOD PRESSURE: 89 MMHG | SYSTOLIC BLOOD PRESSURE: 128 MMHG | WEIGHT: 177.69 LBS | BODY MASS INDEX: 26.32 KG/M2 | HEIGHT: 69 IN | TEMPERATURE: 97 F

## 2022-10-17 DIAGNOSIS — J34.3 NASAL TURBINATE HYPERTROPHY: Primary | ICD-10-CM

## 2022-10-17 PROCEDURE — 31237 NSL/SINS NDSC SURG BX POLYPC: CPT | Mod: 50,S$GLB,, | Performed by: OTOLARYNGOLOGY

## 2022-10-17 PROCEDURE — 99999 PR PBB SHADOW E&M-EST. PATIENT-LVL III: ICD-10-PCS | Mod: PBBFAC,,, | Performed by: OTOLARYNGOLOGY

## 2022-10-17 PROCEDURE — 31237 PR NASAL/SINUS ENDOSCOPY,BX/RMV POLYP/DEBRID: ICD-10-PCS | Mod: 50,S$GLB,, | Performed by: OTOLARYNGOLOGY

## 2022-10-17 PROCEDURE — 99499 UNLISTED E&M SERVICE: CPT | Mod: S$GLB,,, | Performed by: OTOLARYNGOLOGY

## 2022-10-17 PROCEDURE — 99999 PR PBB SHADOW E&M-EST. PATIENT-LVL III: CPT | Mod: PBBFAC,,, | Performed by: OTOLARYNGOLOGY

## 2022-10-17 PROCEDURE — 99499 NO LOS: ICD-10-PCS | Mod: S$GLB,,, | Performed by: OTOLARYNGOLOGY

## 2022-10-17 NOTE — PROGRESS NOTES
NASAL ENDOSCOPY WITH POLYPECTOMY &/OR DEBRIDEMENT  (cpt 80343)      SHE REPORTS MAJOR SUBJECTIVE IMPROVEMENT IN BREATHING.  PAIN CONTROLLED WITH OTC MEDS.  OVERALL HAPPY.  HAVING THICK CRUSTS AND MUCUS WITH SALINE IRRIGATION    Procedure: Risks, benefits, and alternatives of the procedure were discussed with the patient, and the patient consented to the nasal endoscopy with debridement.  Anterior rhinoscopy was insufficient to explain patients symptoms.      The nasal cavity was sprayed with a topical decongestant and anesthetic . The endoscope was passed into each nostril and each nasal cavity was visualized.  On each side the nasal cavity, sinuses (if open), turbinates, and septum were examined with the findings described below. The turbinates, middle meatus, superior meatus and sphenoethmoidal recess was examined.  Crusting and polypoid material was removed with suction and straight non thru cut forceps.   At the end of the examination, the scope was removed. The patient tolerated the procedure well with no complications.     Endoscopic Sinonasal Exam Findings:            The right sinus(es) have mild edema            The left sinus(es) have mild edema  -     Nasal secretions: dried crusts, dried blood, and thick glue-like mucous bilaterally  -     Nasal septum: no significant deviation and no perforation appreciated   -     Inferior turbinate: turbinate partially resected bilaterally  -     Middle turbinate: - normal mucosa without significant hypertrophy - bilaterally  -     Other findings:  SHE HAD AREAS OF DEMUCOSALIZATION ON THE RIGHT  IT AND A LARGE ADHERENT CRUSTING ON THE HEAD THAT WAS REMOVED WITH TRUE CUT FORCEPS.  THERE WAS A LARGE CRUST ALONG THE LENGTH OF THE LEFT IT THAT WAS REMOVED USING A COMBINATION OF NON TRUE CUT FORCEPS AND SUCTION.

## 2022-12-06 ENCOUNTER — TELEPHONE (OUTPATIENT)
Dept: OTOLARYNGOLOGY | Facility: CLINIC | Age: 27
End: 2022-12-06
Payer: COMMERCIAL

## 2022-12-06 NOTE — TELEPHONE ENCOUNTER
Advised to continue xyzal as directed for allergy symptoms. Advised that small amount of blood streaking after blowing nose is ok. Advised to call if blood starts flowing or is significant in amount.

## (undated) DEVICE — NDL HYPO 27G X 1 1/2

## (undated) DEVICE — DRAPE ORTH SPLIT 77X108IN

## (undated) DEVICE — ELECTRODE REM PLYHSV RETURN 9

## (undated) DEVICE — GLOVE SURG BIOGEL LATEX SZ 7.5

## (undated) DEVICE — SEE L#120831

## (undated) DEVICE — INSTRUMENT FRAZIER 10FR W/VENT

## (undated) DEVICE — MANIFOLD 4 PORT

## (undated) DEVICE — TUBING SUCTION STRAIGHT .25X20

## (undated) DEVICE — SPONGE PATTY SURGICAL .5X3IN

## (undated) DEVICE — KIT ANTIFOG

## (undated) DEVICE — COVER CAMERA OPERATING ROOM

## (undated) DEVICE — SUPPORT ULNA NERVE PROTECTOR

## (undated) DEVICE — TOWEL OR DISP STRL BLUE 4/PK

## (undated) DEVICE — BLADE QUADCUT STRAIGHT 4.3MM

## (undated) DEVICE — COVER LIGHT HANDLE 80/CA

## (undated) DEVICE — SUT 4/0 18IN CHROMIC GUT PS

## (undated) DEVICE — SOL NS 1000CC

## (undated) DEVICE — SYR 10CC LUER LOCK

## (undated) DEVICE — SEE MEDLINE ITEM 157027

## (undated) DEVICE — BLADE TRICUT ROTATABLE 4MM

## (undated) DEVICE — SUT PLAIN 4-0 SC-1 18IN